# Patient Record
Sex: FEMALE | Race: WHITE | Employment: FULL TIME | URBAN - NONMETROPOLITAN AREA
[De-identification: names, ages, dates, MRNs, and addresses within clinical notes are randomized per-mention and may not be internally consistent; named-entity substitution may affect disease eponyms.]

---

## 2019-12-22 ENCOUNTER — HOSPITAL ENCOUNTER (EMERGENCY)
Age: 44
Discharge: HOME | End: 2019-12-22
Attending: EMERGENCY MEDICINE
Payer: COMMERCIAL

## 2019-12-22 VITALS
HEIGHT: 67 IN | HEART RATE: 83 BPM | WEIGHT: 182 LBS | BODY MASS INDEX: 28.56 KG/M2 | OXYGEN SATURATION: 100 % | DIASTOLIC BLOOD PRESSURE: 81 MMHG | SYSTOLIC BLOOD PRESSURE: 132 MMHG | RESPIRATION RATE: 18 BRPM | TEMPERATURE: 98.1 F

## 2019-12-22 DIAGNOSIS — J06.9 VIRAL URI WITH COUGH: Primary | ICD-10-CM

## 2019-12-22 DIAGNOSIS — J45.21 MILD INTERMITTENT ASTHMA WITH EXACERBATION: ICD-10-CM

## 2019-12-22 PROCEDURE — 99283 EMERGENCY DEPT VISIT LOW MDM: CPT

## 2019-12-22 PROCEDURE — 6370000000 HC RX 637 (ALT 250 FOR IP): Performed by: EMERGENCY MEDICINE

## 2019-12-22 RX ORDER — PREDNISONE 10 MG/1
TABLET ORAL
Qty: 30 TABLET | Refills: 0 | Status: SHIPPED | OUTPATIENT
Start: 2019-12-22 | End: 2020-01-01

## 2019-12-22 RX ORDER — IPRATROPIUM BROMIDE AND ALBUTEROL SULFATE 2.5; .5 MG/3ML; MG/3ML
1 SOLUTION RESPIRATORY (INHALATION) ONCE
Status: COMPLETED | OUTPATIENT
Start: 2019-12-22 | End: 2019-12-22

## 2019-12-22 RX ORDER — ALBUTEROL SULFATE 90 UG/1
2 AEROSOL, METERED RESPIRATORY (INHALATION) EVERY 4 HOURS
Qty: 2 INHALER | Refills: 0 | Status: SHIPPED | OUTPATIENT
Start: 2019-12-22

## 2019-12-22 RX ORDER — IBUPROFEN 600 MG/1
600 TABLET ORAL ONCE
Status: COMPLETED | OUTPATIENT
Start: 2019-12-22 | End: 2019-12-22

## 2019-12-22 RX ADMIN — IBUPROFEN 600 MG: 600 TABLET, FILM COATED ORAL at 04:52

## 2019-12-22 RX ADMIN — IPRATROPIUM BROMIDE AND ALBUTEROL SULFATE 1 AMPULE: .5; 3 SOLUTION RESPIRATORY (INHALATION) at 04:52

## 2019-12-22 RX ADMIN — PREDNISONE 50 MG: 20 TABLET ORAL at 04:52

## 2019-12-22 RX ADMIN — IPRATROPIUM BROMIDE AND ALBUTEROL SULFATE 1 AMPULE: .5; 3 SOLUTION RESPIRATORY (INHALATION) at 04:51

## 2019-12-22 ASSESSMENT — PAIN SCALES - GENERAL
PAINLEVEL_OUTOF10: 2
PAINLEVEL_OUTOF10: 2

## 2019-12-22 ASSESSMENT — PAIN DESCRIPTION - PAIN TYPE: TYPE: ACUTE PAIN

## 2019-12-22 ASSESSMENT — PAIN DESCRIPTION - DESCRIPTORS: DESCRIPTORS: ACHING;BURNING

## 2019-12-27 ENCOUNTER — HOSPITAL ENCOUNTER (EMERGENCY)
Age: 44
Discharge: HOME | End: 2019-12-27
Attending: EMERGENCY MEDICINE
Payer: COMMERCIAL

## 2019-12-27 VITALS
BODY MASS INDEX: 28.56 KG/M2 | SYSTOLIC BLOOD PRESSURE: 156 MMHG | TEMPERATURE: 97.9 F | OXYGEN SATURATION: 100 % | WEIGHT: 182 LBS | HEART RATE: 68 BPM | RESPIRATION RATE: 14 BRPM | HEIGHT: 67 IN | DIASTOLIC BLOOD PRESSURE: 84 MMHG

## 2019-12-27 DIAGNOSIS — J22 ACUTE RESPIRATORY INFECTION: Primary | ICD-10-CM

## 2019-12-27 DIAGNOSIS — R05.9 COUGH: ICD-10-CM

## 2019-12-27 PROCEDURE — 99282 EMERGENCY DEPT VISIT SF MDM: CPT

## 2019-12-27 RX ORDER — FLUCONAZOLE 150 MG/1
150 TABLET ORAL ONCE
Qty: 1 TABLET | Refills: 0 | Status: SHIPPED | OUTPATIENT
Start: 2019-12-27 | End: 2019-12-27

## 2019-12-27 RX ORDER — AZITHROMYCIN 250 MG/1
250 TABLET, FILM COATED ORAL SEE ADMIN INSTRUCTIONS
Qty: 6 TABLET | Refills: 0 | Status: SHIPPED | OUTPATIENT
Start: 2019-12-27 | End: 2020-01-01

## 2019-12-27 ASSESSMENT — PAIN DESCRIPTION - PAIN TYPE: TYPE: ACUTE PAIN

## 2019-12-27 ASSESSMENT — PAIN SCALES - GENERAL: PAINLEVEL_OUTOF10: 7

## 2019-12-27 ASSESSMENT — PAIN DESCRIPTION - FREQUENCY: FREQUENCY: CONTINUOUS

## 2019-12-27 ASSESSMENT — PAIN DESCRIPTION - LOCATION: LOCATION: BACK;CHEST

## 2019-12-27 ASSESSMENT — PAIN DESCRIPTION - DESCRIPTORS: DESCRIPTORS: BURNING

## 2020-02-04 ENCOUNTER — HOSPITAL ENCOUNTER (OUTPATIENT)
Age: 45
Discharge: HOME | End: 2020-02-04
Payer: COMMERCIAL

## 2020-02-04 ENCOUNTER — HOSPITAL ENCOUNTER (OUTPATIENT)
Dept: GENERAL RADIOLOGY | Age: 45
Discharge: HOME | End: 2020-02-04
Payer: COMMERCIAL

## 2020-02-04 PROCEDURE — 71046 X-RAY EXAM CHEST 2 VIEWS: CPT

## 2020-12-13 ENCOUNTER — APPOINTMENT (OUTPATIENT)
Dept: GENERAL RADIOLOGY | Age: 45
End: 2020-12-13
Payer: COMMERCIAL

## 2020-12-13 ENCOUNTER — HOSPITAL ENCOUNTER (EMERGENCY)
Age: 45
Discharge: HOME | End: 2020-12-13
Attending: EMERGENCY MEDICINE
Payer: COMMERCIAL

## 2020-12-13 VITALS
SYSTOLIC BLOOD PRESSURE: 131 MMHG | WEIGHT: 174 LBS | HEART RATE: 75 BPM | OXYGEN SATURATION: 98 % | DIASTOLIC BLOOD PRESSURE: 57 MMHG | TEMPERATURE: 98.5 F | RESPIRATION RATE: 18 BRPM | HEIGHT: 67 IN | BODY MASS INDEX: 27.31 KG/M2

## 2020-12-13 LAB
ANION GAP SERPL CALCULATED.3IONS-SCNC: 10 MMOL/L (ref 3–16)
BASOPHILS ABSOLUTE: 0.1 K/UL (ref 0–0.2)
BASOPHILS RELATIVE PERCENT: 0.9 %
BILIRUBIN URINE: NEGATIVE
BLOOD, URINE: NEGATIVE
BUN BLDV-MCNC: 7 MG/DL (ref 7–20)
CALCIUM SERPL-MCNC: 9.4 MG/DL (ref 8.3–10.6)
CHLORIDE BLD-SCNC: 103 MMOL/L (ref 99–110)
CLARITY: CLEAR
CO2: 25 MMOL/L (ref 21–32)
COLOR: YELLOW
CREAT SERPL-MCNC: <0.5 MG/DL (ref 0.6–1.1)
EOSINOPHILS ABSOLUTE: 0.3 K/UL (ref 0–0.6)
EOSINOPHILS RELATIVE PERCENT: 2.6 %
GFR AFRICAN AMERICAN: >60
GFR NON-AFRICAN AMERICAN: >60
GLUCOSE BLD-MCNC: 86 MG/DL (ref 70–99)
GLUCOSE URINE: NEGATIVE MG/DL
GONADOTROPIN, CHORIONIC (HCG) QUANT: <5 MIU/ML
HCT VFR BLD CALC: 41.1 % (ref 36–48)
HEMOGLOBIN: 14 G/DL (ref 12–16)
KETONES, URINE: NEGATIVE MG/DL
LEUKOCYTE ESTERASE, URINE: NEGATIVE
LYMPHOCYTES ABSOLUTE: 2.4 K/UL (ref 1–5.1)
LYMPHOCYTES RELATIVE PERCENT: 21.4 %
MCH RBC QN AUTO: 30.8 PG (ref 26–34)
MCHC RBC AUTO-ENTMCNC: 33.9 G/DL (ref 31–36)
MCV RBC AUTO: 90.7 FL (ref 80–100)
MICROSCOPIC EXAMINATION: NORMAL
MONOCYTES ABSOLUTE: 0.7 K/UL (ref 0–1.3)
MONOCYTES RELATIVE PERCENT: 5.9 %
NEUTROPHILS ABSOLUTE: 7.8 K/UL (ref 1.7–7.7)
NEUTROPHILS RELATIVE PERCENT: 69.2 %
NITRITE, URINE: NEGATIVE
PDW BLD-RTO: 13.2 % (ref 12.4–15.4)
PH UA: 6 (ref 5–8)
PLATELET # BLD: 264 K/UL (ref 135–450)
PMV BLD AUTO: 9.1 FL (ref 5–10.5)
POTASSIUM REFLEX MAGNESIUM: 3.9 MMOL/L (ref 3.5–5.1)
PROTEIN UA: NEGATIVE MG/DL
RBC # BLD: 4.53 M/UL (ref 4–5.2)
SODIUM BLD-SCNC: 138 MMOL/L (ref 136–145)
SPECIFIC GRAVITY UA: 1.01 (ref 1–1.03)
URINE TYPE: NORMAL
UROBILINOGEN, URINE: 0.2 E.U./DL
WBC # BLD: 11.3 K/UL (ref 4–11)

## 2020-12-13 PROCEDURE — 6370000000 HC RX 637 (ALT 250 FOR IP): Performed by: EMERGENCY MEDICINE

## 2020-12-13 PROCEDURE — 84702 CHORIONIC GONADOTROPIN TEST: CPT

## 2020-12-13 PROCEDURE — 96374 THER/PROPH/DIAG INJ IV PUSH: CPT

## 2020-12-13 PROCEDURE — 6360000002 HC RX W HCPCS: Performed by: EMERGENCY MEDICINE

## 2020-12-13 PROCEDURE — 36415 COLL VENOUS BLD VENIPUNCTURE: CPT

## 2020-12-13 PROCEDURE — 80048 BASIC METABOLIC PNL TOTAL CA: CPT

## 2020-12-13 PROCEDURE — 85025 COMPLETE CBC W/AUTO DIFF WBC: CPT

## 2020-12-13 PROCEDURE — 81003 URINALYSIS AUTO W/O SCOPE: CPT

## 2020-12-13 PROCEDURE — 71045 X-RAY EXAM CHEST 1 VIEW: CPT

## 2020-12-13 PROCEDURE — 99285 EMERGENCY DEPT VISIT HI MDM: CPT

## 2020-12-13 PROCEDURE — 96375 TX/PRO/DX INJ NEW DRUG ADDON: CPT

## 2020-12-13 PROCEDURE — U0003 INFECTIOUS AGENT DETECTION BY NUCLEIC ACID (DNA OR RNA); SEVERE ACUTE RESPIRATORY SYNDROME CORONAVIRUS 2 (SARS-COV-2) (CORONAVIRUS DISEASE [COVID-19]), AMPLIFIED PROBE TECHNIQUE, MAKING USE OF HIGH THROUGHPUT TECHNOLOGIES AS DESCRIBED BY CMS-2020-01-R: HCPCS

## 2020-12-13 PROCEDURE — 2580000003 HC RX 258: Performed by: EMERGENCY MEDICINE

## 2020-12-13 PROCEDURE — 96376 TX/PRO/DX INJ SAME DRUG ADON: CPT

## 2020-12-13 RX ORDER — ONDANSETRON 4 MG/1
4 TABLET, ORALLY DISINTEGRATING ORAL EVERY 8 HOURS PRN
Qty: 15 TABLET | Refills: 0 | Status: SHIPPED | OUTPATIENT
Start: 2020-12-13 | End: 2021-08-16

## 2020-12-13 RX ORDER — ACETAMINOPHEN 325 MG/1
650 TABLET ORAL ONCE
Status: COMPLETED | OUTPATIENT
Start: 2020-12-13 | End: 2020-12-13

## 2020-12-13 RX ORDER — 0.9 % SODIUM CHLORIDE 0.9 %
1000 INTRAVENOUS SOLUTION INTRAVENOUS ONCE
Status: COMPLETED | OUTPATIENT
Start: 2020-12-13 | End: 2020-12-13

## 2020-12-13 RX ORDER — ONDANSETRON 2 MG/ML
4 INJECTION INTRAMUSCULAR; INTRAVENOUS ONCE
Status: COMPLETED | OUTPATIENT
Start: 2020-12-13 | End: 2020-12-13

## 2020-12-13 RX ORDER — KETOROLAC TROMETHAMINE 30 MG/ML
15 INJECTION, SOLUTION INTRAMUSCULAR; INTRAVENOUS ONCE
Status: COMPLETED | OUTPATIENT
Start: 2020-12-13 | End: 2020-12-13

## 2020-12-13 RX ADMIN — ONDANSETRON HYDROCHLORIDE 4 MG: 2 INJECTION, SOLUTION INTRAMUSCULAR; INTRAVENOUS at 19:34

## 2020-12-13 RX ADMIN — SODIUM CHLORIDE 1000 ML: 9 INJECTION, SOLUTION INTRAVENOUS at 16:54

## 2020-12-13 RX ADMIN — KETOROLAC TROMETHAMINE 15 MG: 30 INJECTION, SOLUTION INTRAMUSCULAR; INTRAVENOUS at 16:54

## 2020-12-13 RX ADMIN — ONDANSETRON HYDROCHLORIDE 4 MG: 2 INJECTION, SOLUTION INTRAMUSCULAR; INTRAVENOUS at 16:54

## 2020-12-13 RX ADMIN — ACETAMINOPHEN 650 MG: 325 TABLET ORAL at 19:34

## 2020-12-13 ASSESSMENT — PAIN SCALES - GENERAL
PAINLEVEL_OUTOF10: 3

## 2020-12-13 ASSESSMENT — PAIN DESCRIPTION - LOCATION: LOCATION: HEAD

## 2020-12-14 ENCOUNTER — CARE COORDINATION (OUTPATIENT)
Dept: CARE COORDINATION | Age: 45
End: 2020-12-14

## 2020-12-16 LAB — SARS-COV-2, NAA: NOT DETECTED

## 2020-12-16 ASSESSMENT — ENCOUNTER SYMPTOMS
VOMITING: 1
NAUSEA: 1
ABDOMINAL PAIN: 0
SHORTNESS OF BREATH: 0
EYE DISCHARGE: 0
COUGH: 1
SORE THROAT: 0
DIARRHEA: 0
BACK PAIN: 0

## 2020-12-16 NOTE — ED PROVIDER NOTES
[address] [facility]        Pt Name: Darling Barnes  MRN: [id number]  [place] 1975  Date of evaluation: 12/13/2020  Provider: Eugenio Segura MD  PCP: Yunier Izquierdo  ED Attending: No att. providers found    [address] [place] [address]       Chief Complaint   Patient presents with    Emesis    Headache    Chills     no thermometer at home, loss of taste    Cough       HISTORY OF PRESENT ILLNESS   (Location/Symptom, Timing/Onset, Context/Setting, Quality, Duration, Modifying Factors, Severity)  Note limiting factors. Darling Barnes is a 39 y.o. female who presents to the ED with increasing nausea/vomiting, headache, cough, loss of taste, chills, and a feeling of fever. It's been present and increasing for the last several days. Patient denies any known contacts with COVID. She is unable to keep anything down. Nothing really seems to make the symptoms better or worse. She denies SOB, myalgias. Headache is bifrontal and throbbing, moderate, without exacerbating or alleviating factors. Similar to prior headaches she has had. History is obtained from the patient. REVIEW OF SYSTEMS    (2-9 systems for level 4, 10 or more for level 5)     Review of Systems   Constitutional: Positive for chills and fever. HENT: Negative for congestion and sore throat. Eyes: Negative for discharge. Respiratory: Positive for cough. Negative for shortness of breath. Cardiovascular: Negative for chest pain. Gastrointestinal: Positive for nausea and vomiting. Negative for abdominal pain and diarrhea. Endocrine: Negative for polydipsia. Genitourinary: Negative for dysuria and urgency. Musculoskeletal: Negative for arthralgias, back pain and myalgias. Skin: Negative for rash. Neurological: Positive for headaches. Negative for weakness. Hematological: Does not bruise/bleed easily. Psychiatric/Behavioral: Negative for confusion.        Positives and Pertinent negatives as per HPI. Except as noted above in the ROS, all other systems were reviewed and negative. PAST MEDICAL HISTORY     Past Medical History:   Diagnosis Date    Asthma     MRSA (methicillin resistant staph aureus) culture positive 1/11/15    throat    Obesity          SURGICAL HISTORY     Past Surgical History:   Procedure Laterality Date     SECTION      HYSTERECTOMY      TUBAL LIGATION           CURRENTMEDICATIONS       Discharge Medication List as of 2020  7:21 PM      CONTINUE these medications which have NOT CHANGED    Details   Montelukast Sodium (SINGULAIR PO) Take by mouthHistorical Med      Cetirizine HCl (ZYRTEC PO) Take by mouthHistorical Med      Mometasone Furoate (NASONEX NA) by Nasal routeHistorical Med      VITAMIN D, CHOLECALCIFEROL, PO Take by mouthHistorical Med      albuterol sulfate  (90 Base) MCG/ACT inhaler Inhale 2 puffs into the lungs every 4 hours, Disp-2 Inhaler, R-0Print               ALLERGIES     Avelox [moxifloxacin], Ultram [tramadol hcl], and Codeine    FAMILYHISTORY     No family history on file. SOCIAL HISTORY       Social History     Socioeconomic History    Marital status:       Spouse name: Not on file    Number of children: Not on file    Years of education: Not on file    Highest education level: Not on file   Occupational History    Not on file   Social Needs    Financial resource strain: Not on file    Food insecurity     Worry: Not on file     Inability: Not on file    Transportation needs     Medical: Not on file     Non-medical: Not on file   Tobacco Use    Smoking status: Current Every Day Smoker     Packs/day: 1.00     Years: 22.00     Pack years: 22.00     Types: Cigarettes    Smokeless tobacco: Never Used   Substance and Sexual Activity    Alcohol use: No    Drug use: No    Sexual activity: Yes     Partners: Male   Lifestyle    Physical activity     Days per week: Not on file     Minutes per session: Not on file    Stress: Not on file   Relationships    Social connections     Talks on phone: Not on file     Gets together: Not on file     Attends Taoist service: Not on file     Active member of club or organization: Not on file     Attends meetings of clubs or organizations: Not on file     Relationship status: Not on file    Intimate partner violence     Fear of current or ex partner: Not on file     Emotionally abused: Not on file     Physically abused: Not on file     Forced sexual activity: Not on file   Other Topics Concern    Not on file   Social History Narrative    Not on file       SCREENINGS             PHYSICAL EXAM    (up to 7 for level 4, 8 or more for level 5)     ED Triage Vitals [12/13/20 1511]   BP Temp Temp src Pulse Resp SpO2 Height Weight   135/68 98.5 °F (36.9 °C) -- 70 20 100 % 5' 7\" (1.702 m) 174 lb (78.9 kg)       Physical Exam  Constitutional:       General: She is not in acute distress. Appearance: She is well-developed and overweight. HENT:      Head: Normocephalic and atraumatic. Right Ear: External ear normal.      Left Ear: External ear normal.   Eyes:      General: No scleral icterus. Conjunctiva/sclera: Conjunctivae normal.   Neck:      Musculoskeletal: Normal range of motion and neck supple. Cardiovascular:      Rate and Rhythm: Normal rate and regular rhythm. Heart sounds: Normal heart sounds. No murmur. No friction rub. No gallop. Pulmonary:      Effort: Pulmonary effort is normal. No respiratory distress. Breath sounds: Normal breath sounds. No wheezing. Abdominal:      General: Bowel sounds are normal. There is no distension. Palpations: Abdomen is soft. Tenderness: There is no abdominal tenderness. There is no guarding or rebound. Musculoskeletal: Normal range of motion. General: No tenderness. Lymphadenopathy:      Cervical: No cervical adenopathy. Skin:     General: Skin is warm and dry.       Findings: No rash. Neurological:      Mental Status: She is alert and oriented to person, place, and time. Cranial Nerves: No cranial nerve deficit. Psychiatric:         Behavior: Behavior is cooperative.          DIAGNOSTIC RESULTS   LABS:    Results for orders placed or performed during the hospital encounter of 12/13/20   Urinalysis, reflex to microscopic   Result Value Ref Range    Color, UA Yellow Straw/Yellow    Clarity, UA Clear Clear    Glucose, Ur Negative Negative mg/dL    Bilirubin Urine Negative Negative    Ketones, Urine Negative Negative mg/dL    Specific Gravity, UA 1.015 1.005 - 1.030    Blood, Urine Negative Negative    pH, UA 6.0 5.0 - 8.0    Protein, UA Negative Negative mg/dL    Urobilinogen, Urine 0.2 <2.0 E.U./dL    Nitrite, Urine Negative Negative    Leukocyte Esterase, Urine Negative Negative    Microscopic Examination Not Indicated     Urine Type NotGiven    CBC auto differential   Result Value Ref Range    WBC 11.3 (H) 4.0 - 11.0 K/uL    RBC 4.53 4.00 - 5.20 M/uL    Hemoglobin 14.0 12.0 - 16.0 g/dL    Hematocrit 41.1 36.0 - 48.0 %    MCV 90.7 80.0 - 100.0 fL    MCH 30.8 26.0 - 34.0 pg    MCHC 33.9 31.0 - 36.0 g/dL    RDW 13.2 12.4 - 15.4 %    Platelets 786 710 - 727 K/uL    MPV 9.1 5.0 - 10.5 fL    Neutrophils % 69.2 %    Lymphocytes % 21.4 %    Monocytes % 5.9 %    Eosinophils % 2.6 %    Basophils % 0.9 %    Neutrophils Absolute 7.8 (H) 1.7 - 7.7 K/uL    Lymphocytes Absolute 2.4 1.0 - 5.1 K/uL    Monocytes Absolute 0.7 0.0 - 1.3 K/uL    Eosinophils Absolute 0.3 0.0 - 0.6 K/uL    Basophils Absolute 0.1 0.0 - 0.2 K/uL   Basic Metabolic Panel w/ Reflex to MG   Result Value Ref Range    Sodium 138 136 - 145 mmol/L    Potassium reflex Magnesium 3.9 3.5 - 5.1 mmol/L    Chloride 103 99 - 110 mmol/L    CO2 25 21 - 32 mmol/L    Anion Gap 10 3 - 16    Glucose 86 70 - 99 mg/dL    BUN 7 7 - 20 mg/dL    CREATININE <0.5 (L) 0.6 - 1.1 mg/dL    GFR Non-African American >60 >60    GFR  >60 >60    Calcium 9.4 8.3 - 10.6 mg/dL   hCG, quantitative, pregnancy   Result Value Ref Range    hCG Quant <5.0 <5.0 mIU/mL   COVID-19   Result Value Ref Range    SARS-CoV-2, SOCRATES NOT DETECTED NOT DETECTED       All other labs were within normal range ornot returned as of this dictation. EKG: All EKG's are interpreted by the Emergency Department Physician who either signs or Co-signs this chart in the absence of a cardiologist.  Please see their note for interpretation of EKG. RADIOLOGY:   Non-plain film images such as CT, Ultrasound and MRI are read by the radiologist.  Plain radiographic images are visualized and preliminarily interpreted by the ED Provider with the belowfindings:    Interpretation per the Radiologist below, if available at the time of this note:    XR CHEST PORTABLE   Final Result   No acute airspace disease identified. PROCEDURES   Unless otherwise noted below, none     Procedures    CRITICAL CARE TIME   N/A    CONSULTS:  None      EMERGENCY DEPARTMENT COURSE and DIFFERENTIAL DIAGNOSIS/MDM:   Vitals:    Vitals:    12/13/20 1511 12/13/20 1654 12/13/20 1915   BP: 135/68 134/79 (!) 131/57   Pulse: 70 61 75   Resp: 20 18 18   Temp: 98.5 °F (36.9 °C)     SpO2: 100% 98% 98%   Weight: 174 lb (78.9 kg)     Height: 5' 7\" (1.702 m)         Patient was given the following medications:  Medications   ketorolac (TORADOL) injection 15 mg (15 mg Intravenous Given 12/13/20 1654)   ondansetron (ZOFRAN) injection 4 mg (4 mg Intravenous Given 12/13/20 1654)   0.9 % sodium chloride bolus (0 mLs Intravenous Stopped 12/13/20 1754)   ondansetron (ZOFRAN) injection 4 mg (4 mg Intravenous Given 12/13/20 1934)   acetaminophen (TYLENOL) tablet 650 mg (650 mg Oral Given 12/13/20 1934)     Patient has a benign abdominal exam.  I am concerned that she may have COVID given the lost taste. She was given IV fluids, toradol, zofran, acetaminophen with improvement of her symptoms.   Patient's lab work does not reveal a cause for her symptoms. COVID screening was obtained and is pending. Patient feels improved enough to go home. I've written for nausea medication. I want her to see her PCP for further outpatient follow up. Patient is agreeable with this plan. I estimate there is LOW risk for ACUTE APPENDICITIS, BOWEL OBSTRUCTION, CHOLECYSTITIS, DIVERTICULITIS, INCARCERATED HERNIA, PANCREATITIS, PELVIC INFLAMMATORY DISEASE, PERFORATED BOWEL or ULCER, PREGNANCY, or TUBO-OVARIAN ABSCESS, thus I consider the discharge disposition reasonable. Also, there is no evidence or peritonitis, sepsis, or toxicity. Ravinder Davenport and I have discussed the diagnosis and risks, and we agree with discharging home to follow-up with their primary doctor. We also discussed returning to the Emergency Department immediately if new or worsening symptoms occur. We have discussed the symptoms which are most concerning (e.g., bloody stool, fever, changing or worsening pain, vomiting) that necessitate immediate return. Blood pressure (!) 131/57, pulse 75, temperature 98.5 °F (36.9 °C), resp. rate 18, height 5' 7\" (1.702 m), weight 174 lb (78.9 kg), last menstrual period 11/16/2014, SpO2 98 %, not currently breastfeeding. The patient understands the importance of follow up and reasons to return. FINAL IMPRESSION      1. Non-intractable vomiting with nausea, unspecified vomiting type    2. Acute nonintractable headache, unspecified headache type    3. Cough    4. Suspected COVID-19 virus infection          DISPOSITION/PLAN   DISPOSITION Decision To Discharge 12/13/2020 07:18:59 PM      PATIENT REFERRED TO:  Fiorella Muñoz  [address]   [phone]    Schedule an appointment as soon as possible for a visit in 1 week      [facility] [id number]  [facility] Emergency Department  [address]  Go to   If symptoms worsen      DISCHARGE MEDICATIONS:  Discharge Medication List as of 12/13/2020

## 2020-12-16 NOTE — CARE COORDINATION
ACM tried contacting pt on 2020 to follow up on ED visit from 2020 dx: Non-intractable vomiting with nausea, unspecified vomiting typeAcute nonintractable headache, unspecified headache typeCough Suspected COVID-19 virus infection. ACM was able to contact Murrell Najjar on 2020. Today, she is c/o diarrhea, HA, cough. She denies fever and shortness of breath. Symptoms x 6 days started last Thursday,   She is taking Zofran. Encouraged hydration. Suggested Activa yogurt. Emphasized importance of follow up with PCP. Offered to assist.   Reviewed CDC protocol. Informed COVID test non-detected. Advised to consult PCP regarding return to work. 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 Discussed COVID-19 related testing which was available at this time. Test results were negative. Patient informed of results, if available? Yes    Care Transition Nurse/ Ambulatory Care Manager contacted the patient by telephone to perform post discharge assessment. Call within 2 business days of discharge: Yes. Verified name and  with patient as identifiers. Provided introduction to self, and explanation of the CTN/ACM role, and reason for call due to risk factors for infection and/or exposure to COVID-19. Symptoms reviewed with patient who verbalized the following symptoms: cough, nausea, diarrhea, no new symptoms, no worsening symptoms and denies fever and SOB. Due to no new or worsening symptoms encounter was not routed to provider for escalation. Discussed follow-up appointments. If no appointment was previously scheduled, appointment scheduling offered: Yes  [facility] follow up appointment(s): No future appointments.   [facility](s):     Non-face-to-face services provided:  Obtained and reviewed discharge summary and/or continuity of care documents  Reviewed and followed up on pending diagnostic tests and treatments-reviewed labs and x-rays. discussed need for follow up  Education of patient/family/caregiver/guardian to support self-management-encouraged hydration, made dietary suggestions, rest and to monitor symptoms and follow up as suggested  Assessment and support for treatment adherence and medication management-discussed purpose of zofran and directions. Advance Care Planning:   Does patient have an Advance Directive:  not on file. Patient has following risk factors of: no known risk factors. CTN/ACM reviewed discharge instructions, medical action plan and red flags such as increased shortness of breath, increasing fever and signs of decompensation with patient who verbalized understanding. Discussed exposure protocols and quarantine with CDC Guidelines What to do if you are sick with coronavirus disease 2019.  Patient was given an opportunity for questions and concerns. The patient agrees to contact the [facility] hot line and PCP office for questions related to their healthcare. CTN/ACM provided contact information for future needs. Reviewed and educated patient on any new and changed medications related to discharge diagnosis     Patient/family/caregiver given information for GetWell Loop and agrees to enroll yes  Patient's preferred e-mail: [email or web address]   Patient's preferred phone number: [phone]  Based on Loop alert triggers, patient will be contacted by nurse care manager for worsening symptoms. Pt will be further monitored by COVID Loop Team based on severity of symptoms and risk factors.

## 2021-01-18 ENCOUNTER — HOSPITAL ENCOUNTER (OUTPATIENT)
Dept: GENERAL RADIOLOGY | Age: 46
Discharge: HOME | End: 2021-01-18
Payer: COMMERCIAL

## 2021-01-18 ENCOUNTER — HOSPITAL ENCOUNTER (OUTPATIENT)
Age: 46
Discharge: HOME | End: 2021-01-18
Payer: COMMERCIAL

## 2021-01-18 ENCOUNTER — HOSPITAL ENCOUNTER (OUTPATIENT)
Age: 46
End: 2021-01-18
Payer: COMMERCIAL

## 2021-01-18 DIAGNOSIS — M54.50 CHRONIC MIDLINE LOW BACK PAIN WITHOUT SCIATICA: ICD-10-CM

## 2021-01-18 DIAGNOSIS — G89.29 CHRONIC MIDLINE LOW BACK PAIN WITHOUT SCIATICA: ICD-10-CM

## 2021-01-18 PROCEDURE — 73502 X-RAY EXAM HIP UNI 2-3 VIEWS: CPT

## 2021-01-18 PROCEDURE — 72100 X-RAY EXAM L-S SPINE 2/3 VWS: CPT

## 2021-04-28 ENCOUNTER — HOSPITAL ENCOUNTER (OUTPATIENT)
Dept: GENERAL RADIOLOGY | Age: 46
Discharge: HOME | End: 2021-04-28
Payer: COMMERCIAL

## 2021-04-28 ENCOUNTER — HOSPITAL ENCOUNTER (OUTPATIENT)
Age: 46
Discharge: HOME | End: 2021-04-28
Payer: COMMERCIAL

## 2021-04-28 DIAGNOSIS — M47.816 LUMBAR SPONDYLOSIS: ICD-10-CM

## 2021-04-28 PROCEDURE — 72110 X-RAY EXAM L-2 SPINE 4/>VWS: CPT

## 2021-04-30 ENCOUNTER — HOSPITAL ENCOUNTER (OUTPATIENT)
Dept: GENERAL RADIOLOGY | Age: 46
Discharge: HOME | End: 2021-04-30
Payer: COMMERCIAL

## 2021-04-30 ENCOUNTER — HOSPITAL ENCOUNTER (OUTPATIENT)
Age: 46
Discharge: HOME | End: 2021-04-30
Payer: COMMERCIAL

## 2021-04-30 DIAGNOSIS — R52 PAIN: ICD-10-CM

## 2021-04-30 PROCEDURE — 71046 X-RAY EXAM CHEST 2 VIEWS: CPT

## 2021-08-16 ENCOUNTER — HOSPITAL ENCOUNTER (EMERGENCY)
Age: 46
Discharge: HOME | DRG: 115 | End: 2021-08-16
Payer: COMMERCIAL

## 2021-08-16 VITALS
OXYGEN SATURATION: 98 % | TEMPERATURE: 98.3 F | HEART RATE: 90 BPM | SYSTOLIC BLOOD PRESSURE: 173 MMHG | WEIGHT: 183 LBS | RESPIRATION RATE: 16 BRPM | BODY MASS INDEX: 28.72 KG/M2 | HEIGHT: 67 IN | DIASTOLIC BLOOD PRESSURE: 92 MMHG

## 2021-08-16 DIAGNOSIS — R03.0 ELEVATED BLOOD PRESSURE READING: ICD-10-CM

## 2021-08-16 DIAGNOSIS — H60.391 ACUTE INFECTION OF RIGHT EXTERNAL EAR: Primary | ICD-10-CM

## 2021-08-16 PROCEDURE — 99284 EMERGENCY DEPT VISIT MOD MDM: CPT

## 2021-08-16 PROCEDURE — 6370000000 HC RX 637 (ALT 250 FOR IP): Performed by: PHYSICIAN ASSISTANT

## 2021-08-16 RX ORDER — SULFAMETHOXAZOLE AND TRIMETHOPRIM 800; 160 MG/1; MG/1
1 TABLET ORAL ONCE
Status: COMPLETED | OUTPATIENT
Start: 2021-08-16 | End: 2021-08-16

## 2021-08-16 RX ORDER — CYCLOBENZAPRINE HCL 5 MG
5 TABLET ORAL 3 TIMES DAILY PRN
COMMUNITY
End: 2021-08-16

## 2021-08-16 RX ORDER — HYDROCODONE BITARTRATE AND ACETAMINOPHEN 5; 325 MG/1; MG/1
1 TABLET ORAL EVERY 8 HOURS PRN
COMMUNITY
End: 2022-05-11

## 2021-08-16 RX ORDER — IBUPROFEN 800 MG/1
800 TABLET ORAL EVERY 8 HOURS PRN
Qty: 20 TABLET | Refills: 0 | Status: SHIPPED | OUTPATIENT
Start: 2021-08-16

## 2021-08-16 RX ORDER — CEPHALEXIN 500 MG/1
500 CAPSULE ORAL 4 TIMES DAILY
Qty: 40 CAPSULE | Refills: 0 | Status: ON HOLD | OUTPATIENT
Start: 2021-08-16 | End: 2021-08-19 | Stop reason: HOSPADM

## 2021-08-16 RX ORDER — SULFAMETHOXAZOLE AND TRIMETHOPRIM 800; 160 MG/1; MG/1
1 TABLET ORAL 2 TIMES DAILY
Qty: 20 TABLET | Refills: 0 | Status: ON HOLD | OUTPATIENT
Start: 2021-08-16 | End: 2021-08-19 | Stop reason: HOSPADM

## 2021-08-16 RX ORDER — CEPHALEXIN 500 MG/1
500 CAPSULE ORAL ONCE
Status: COMPLETED | OUTPATIENT
Start: 2021-08-16 | End: 2021-08-16

## 2021-08-16 RX ORDER — CYCLOBENZAPRINE HCL 10 MG
1 TABLET ORAL NIGHTLY PRN
COMMUNITY
Start: 2021-07-07

## 2021-08-16 RX ORDER — IBUPROFEN 400 MG/1
800 TABLET ORAL ONCE
Status: COMPLETED | OUTPATIENT
Start: 2021-08-16 | End: 2021-08-16

## 2021-08-16 RX ADMIN — SULFAMETHOXAZOLE AND TRIMETHOPRIM 1 TABLET: 800; 160 TABLET ORAL at 19:42

## 2021-08-16 RX ADMIN — CEPHALEXIN 500 MG: 500 CAPSULE ORAL at 19:42

## 2021-08-16 RX ADMIN — IBUPROFEN 800 MG: 400 TABLET ORAL at 19:42

## 2021-08-16 ASSESSMENT — PAIN DESCRIPTION - FREQUENCY: FREQUENCY: CONTINUOUS

## 2021-08-16 ASSESSMENT — ENCOUNTER SYMPTOMS
COUGH: 0
VOMITING: 0

## 2021-08-16 ASSESSMENT — PAIN SCALES - GENERAL
PAINLEVEL_OUTOF10: 6
PAINLEVEL_OUTOF10: 6

## 2021-08-16 ASSESSMENT — PAIN DESCRIPTION - PAIN TYPE: TYPE: ACUTE PAIN

## 2021-08-16 ASSESSMENT — PAIN DESCRIPTION - LOCATION: LOCATION: EAR

## 2021-08-16 ASSESSMENT — PAIN DESCRIPTION - PROGRESSION: CLINICAL_PROGRESSION: NOT CHANGED

## 2021-08-16 ASSESSMENT — PAIN DESCRIPTION - ONSET: ONSET: ON-GOING

## 2021-08-16 ASSESSMENT — PAIN DESCRIPTION - ORIENTATION: ORIENTATION: RIGHT

## 2021-08-16 ASSESSMENT — PAIN DESCRIPTION - DESCRIPTORS: DESCRIPTORS: BURNING;ACHING;STABBING

## 2021-08-16 NOTE — ED PROVIDER NOTES
[address] [facility]        Pt Name: Martha Dallas  MRN: [id number]  [place] 1975  Date of evaluation: 2021  Provider: Royal Guerin PA-C  PCP: Amber Thomas    Shared Visit or Autonomous Visit: MARCO A. I have evaluated this patient. My supervising physician was available for consultation. CHIEF COMPLAINT       Chief Complaint   Patient presents with    Ear Problem     Edema to the right ear x1 week        HISTORY OF PRESENT ILLNESS   (Location/Symptom, Timing/Onset, Context/Setting, Quality, Duration, Modifying Factors, Severity)  Note limiting factors. Martha Dallas is a 55 y.o. female presenting to the emergency department for evaluation of right ear pain and swelling for the past 1 week. States thinks she was bit by a spider but could not see exactly where it bit her she did initially have itching and now it is painful. States she squeezed it and it drained a little bit from inside her ear. No fevers. No vomiting. No cough or cold symptoms. The history is provided by the patient. Nursing Notes were reviewed    REVIEW OF SYSTEMS    (2-9 systems for level 4, 10 or more for level 5)     Review of Systems   Constitutional: Negative for fever. HENT: Positive for ear pain. Respiratory: Negative for cough. Gastrointestinal: Negative for vomiting. Positives and Pertinent negatives as per HPI.        PAST MEDICAL HISTORY     Past Medical History:   Diagnosis Date    Asthma     MRSA (methicillin resistant staph aureus) culture positive 1/11/15    throat    Obesity          SURGICAL HISTORY     Past Surgical History:   Procedure Laterality Date     SECTION      HYSTERECTOMY      TUBAL LIGATION           CURRENTMEDICATIONS       Previous Medications    ALBUTEROL SULFATE  (90 BASE) MCG/ACT INHALER    Inhale 2 puffs into the lungs every 4 hours    CETIRIZINE HCL (ZYRTEC PO)    Take by mouth CYCLOBENZAPRINE (FLEXERIL) 10 MG TABLET    Take 1 tablet by mouth nightly as needed    DICLOFENAC SODIUM (VOLTAREN) 1 % GEL    Apply 1 g topically every 12 hours    HYDROCODONE-ACETAMINOPHEN (NORCO) 5-325 MG PER TABLET    Take 1 tablet by mouth every 8 hours as needed for Pain. MOMETASONE FUROATE (NASONEX NA)    by Nasal route    MONTELUKAST SODIUM (SINGULAIR PO)    Take by mouth    VITAMIN D, CHOLECALCIFEROL, PO    Take by mouth         ALLERGIES     Avelox [moxifloxacin], Ultram [tramadol hcl], and Codeine    FAMILYHISTORY     History reviewed. No pertinent family history. SOCIAL HISTORY       Social History     Socioeconomic History    Marital status:      Spouse name: None    Number of children: None    Years of education: None    Highest education level: None   Occupational History    None   Tobacco Use    Smoking status: Current Every Day Smoker     Packs/day: 1.00     Years: 22.00     Pack years: 22.00     Types: Cigarettes    Smokeless tobacco: Never Used   Vaping Use    Vaping Use: Never used   Substance and Sexual Activity    Alcohol use: No    Drug use: No    Sexual activity: Yes     Partners: Male   Other Topics Concern    None   Social History Narrative    None     Social Determinants of Health     Financial Resource Strain:     Difficulty of Paying Living Expenses:    Food Insecurity:     Worried About Running Out of Food in the Last Year:     Ran Out of Food in the Last Year:    Transportation Needs:     Lack of Transportation (Medical):      Lack of Transportation (Non-Medical):    Physical Activity:     Days of Exercise per Week:     Minutes of Exercise per Session:    Stress:     Feeling of Stress :    Social Connections:     Frequency of Communication with Friends and Family:     Frequency of Social Gatherings with Friends and Family:     Attends Orthodox Services:     Active Member of Clubs or Organizations:     Attends Club or Organization Meetings:  Marital Status:    Intimate Partner Violence:     Fear of Current or Ex-Partner:     Emotionally Abused:     Physically Abused:     Sexually Abused:        SCREENINGS    Clinton Coma Scale  Eye Opening: Spontaneous  Best Verbal Response: Oriented  Best Motor Response: Obeys commands  Luis Coma Scale Score: 15        PHYSICAL EXAM    (up to 7 for level 4, 8 or more for level 5)     ED Triage Vitals [08/16/21 1924]   BP Temp Temp Source Pulse Resp SpO2 Height Weight   (!) 173/92 98.3 °F (36.8 °C) Oral 90 16 98 % 5' 7\" (1.702 m) 183 lb (83 kg)       Physical Exam  Vitals and nursing note reviewed. Constitutional:       Appearance: She is well-developed. She is not ill-appearing or toxic-appearing. HENT:      Head: Normocephalic and atraumatic. Right Ear: Tympanic membrane and ear canal normal. No mastoid tenderness. Tympanic membrane is not erythematous or bulging. Left Ear: Tympanic membrane and ear canal normal.      Ears:        Mouth/Throat:      Mouth: Mucous membranes are moist.      Pharynx: Oropharynx is clear. No pharyngeal swelling or posterior oropharyngeal erythema. Eyes:      Conjunctiva/sclera: Conjunctivae normal.   Cardiovascular:      Rate and Rhythm: Normal rate and regular rhythm. Heart sounds: Normal heart sounds. Pulmonary:      Effort: Pulmonary effort is normal. No respiratory distress. Breath sounds: Normal breath sounds. No stridor. No wheezing, rhonchi or rales. Skin:     General: Skin is warm and dry. Neurological:      Mental Status: She is alert and oriented to person, place, and time. Motor: No abnormal muscle tone. Psychiatric:         Behavior: Behavior normal.         DIAGNOSTIC RESULTS   LABS:    Labs Reviewed - No data to display    All other labs were within normal range or not returned as of this dictation. EKG:  All EKG's are interpreted by the Emergency Department Physician in the absence of a cardiologist.  Please see their note for interpretation of EKG. RADIOLOGY:   Non-plain film images such as CT, Ultrasound and MRI are read by the radiologist. Plain radiographic images are visualized andpreliminarily interpreted by the  ED Provider with the below findings:        Interpretation perthe Radiologist below, if available at the time of this note:    No orders to display     No results found. PROCEDURES   Unless otherwise noted below, none     Procedures    CRITICAL CARE TIME   N/A    CONSULTS:  None      EMERGENCY DEPARTMENT COURSE and DIFFERENTIAL DIAGNOSIS/MDM:   Vitals:    Vitals:    08/16/21 1924   BP: (!) 173/92   Pulse: 90   Resp: 16   Temp: 98.3 °F (36.8 °C)   TempSrc: Oral   SpO2: 98%   Weight: 183 lb (83 kg)   Height: 5' 7\" (1.702 m)       Patient was given thefollowing medications:  Medications   sulfamethoxazole-trimethoprim (BACTRIM DS;SEPTRA DS) 800-160 MG per tablet 1 tablet (has no administration in time range)   cephALEXin (KEFLEX) capsule 500 mg (has no administration in time range)   ibuprofen (ADVIL;MOTRIN) tablet 800 mg (has no administration in time range)       ED course  Patient presented to the ER for evaluation of right ear pain and swelling concern for possible spider bite and infection. On exam she has tenderness swelling and faint erythema to the external right ear conchal bowl area. TM and canal normal.  No mastoid tenderness. Afebrile. Not toxic appearing. Elevated blood pressure otherwise vital stable. No palpable fluid collection to drain. Recommend treatment with antibiotic she is started on Bactrim and Keflex. I advised follow-up within 48 hours with either primary care or ENT provided referral.  Return for worsening. I estimate there is LOW risk for SEVERE CELLULITIS, MENINGITIS, PERITONSILLAR ABSCESS, SEPSIS, MALIGNANT OTITIS EXTERNA, OR EPIGLOTTITIS thus I consider the discharge disposition reasonable. FINAL IMPRESSION      1. Acute infection of right external ear    2. Elevated blood pressure reading          DISPOSITION/PLAN   DISPOSITION Decision to Discharge    PATIENT REFERREDTO:  Aureliano Velazquez  [address] [address]  [phone]    In 2 days      [address] Kannan Aguirre [address] [facility] Tahir Costa Sudeep [phone]    In 2 days  ENT, As needed if not improving    [facility] Emergency Department  [address] [facility],[address]  [phone]    If symptoms worsen      DISCHARGE MEDICATIONS:  New Prescriptions    CEPHALEXIN (KEFLEX) 500 MG CAPSULE    Take 1 capsule by mouth 4 times daily for 10 days    IBUPROFEN (IBU) 800 MG TABLET    Take 1 tablet by mouth every 8 hours as needed for Pain    SULFAMETHOXAZOLE-TRIMETHOPRIM (BACTRIM DS) 800-160 MG PER TABLET    Take 1 tablet by mouth 2 times daily for 10 days       DISCONTINUED MEDICATIONS:  Discontinued Medications    CYCLOBENZAPRINE (FLEXERIL) 5 MG TABLET    Take 5 mg by mouth 3 times daily as needed for Muscle spasms    ONDANSETRON (ZOFRAN ODT) 4 MG DISINTEGRATING TABLET    Take 1 tablet by mouth every 8 hours as needed for Nausea or Vomiting              (Please note that portions ofthis note were completed with a voice recognition program.  Efforts were made to edit the dictations but occasionally words are mis-transcribed.)    Chica Gibson PA-C (electronically signed)            Chana Martin PA-C  08/16/21 1941

## 2021-08-18 ENCOUNTER — HOSPITAL ENCOUNTER (INPATIENT)
Age: 46
LOS: 1 days | Discharge: HOME | DRG: 115 | End: 2021-08-19
Attending: EMERGENCY MEDICINE | Admitting: INTERNAL MEDICINE
Payer: COMMERCIAL

## 2021-08-18 ENCOUNTER — APPOINTMENT (OUTPATIENT)
Dept: CT IMAGING | Age: 46
DRG: 115 | End: 2021-08-18
Payer: COMMERCIAL

## 2021-08-18 DIAGNOSIS — M94.8X9 CHONDRITIS: Primary | ICD-10-CM

## 2021-08-18 PROBLEM — H60.01: Status: ACTIVE | Noted: 2021-08-18

## 2021-08-18 LAB
A/G RATIO: 1.5 (ref 1.1–2.2)
ALBUMIN SERPL-MCNC: 4.4 G/DL (ref 3.4–5)
ALP BLD-CCNC: 79 U/L (ref 40–129)
ALT SERPL-CCNC: 13 U/L (ref 10–40)
ANION GAP SERPL CALCULATED.3IONS-SCNC: 13 MMOL/L (ref 3–16)
AST SERPL-CCNC: 16 U/L (ref 15–37)
BASOPHILS ABSOLUTE: 0.1 K/UL (ref 0–0.2)
BASOPHILS RELATIVE PERCENT: 1.1 %
BILIRUB SERPL-MCNC: <0.2 MG/DL (ref 0–1)
BUN BLDV-MCNC: 4 MG/DL (ref 7–20)
CALCIUM SERPL-MCNC: 9.4 MG/DL (ref 8.3–10.6)
CHLORIDE BLD-SCNC: 103 MMOL/L (ref 99–110)
CO2: 22 MMOL/L (ref 21–32)
CREAT SERPL-MCNC: 0.6 MG/DL (ref 0.6–1.1)
EOSINOPHILS ABSOLUTE: 0.3 K/UL (ref 0–0.6)
EOSINOPHILS RELATIVE PERCENT: 2.2 %
GFR AFRICAN AMERICAN: >60
GFR NON-AFRICAN AMERICAN: >60
GLOBULIN: 3 G/DL
GLUCOSE BLD-MCNC: 115 MG/DL (ref 70–99)
HCT VFR BLD CALC: 38.4 % (ref 36–48)
HEMOGLOBIN: 13.2 G/DL (ref 12–16)
LACTIC ACID, SEPSIS: 1 MMOL/L (ref 0.4–1.9)
LYMPHOCYTES ABSOLUTE: 2.3 K/UL (ref 1–5.1)
LYMPHOCYTES RELATIVE PERCENT: 19.1 %
MCH RBC QN AUTO: 31.1 PG (ref 26–34)
MCHC RBC AUTO-ENTMCNC: 34.4 G/DL (ref 31–36)
MCV RBC AUTO: 90.5 FL (ref 80–100)
MONOCYTES ABSOLUTE: 0.7 K/UL (ref 0–1.3)
MONOCYTES RELATIVE PERCENT: 6.3 %
NEUTROPHILS ABSOLUTE: 8.4 K/UL (ref 1.7–7.7)
NEUTROPHILS RELATIVE PERCENT: 71.3 %
PDW BLD-RTO: 13.1 % (ref 12.4–15.4)
PLATELET # BLD: 243 K/UL (ref 135–450)
PMV BLD AUTO: 9 FL (ref 5–10.5)
POTASSIUM REFLEX MAGNESIUM: 3.8 MMOL/L (ref 3.5–5.1)
RBC # BLD: 4.24 M/UL (ref 4–5.2)
SODIUM BLD-SCNC: 138 MMOL/L (ref 136–145)
TOTAL PROTEIN: 7.4 G/DL (ref 6.4–8.2)
WBC # BLD: 11.8 K/UL (ref 4–11)

## 2021-08-18 PROCEDURE — 94761 N-INVAS EAR/PLS OXIMETRY MLT: CPT

## 2021-08-18 PROCEDURE — 6360000004 HC RX CONTRAST MEDICATION: Performed by: PHYSICIAN ASSISTANT

## 2021-08-18 PROCEDURE — 96365 THER/PROPH/DIAG IV INF INIT: CPT

## 2021-08-18 PROCEDURE — 96375 TX/PRO/DX INJ NEW DRUG ADDON: CPT

## 2021-08-18 PROCEDURE — 1200000000 HC SEMI PRIVATE

## 2021-08-18 PROCEDURE — 2580000003 HC RX 258: Performed by: PHYSICIAN ASSISTANT

## 2021-08-18 PROCEDURE — 87040 BLOOD CULTURE FOR BACTERIA: CPT

## 2021-08-18 PROCEDURE — 6360000002 HC RX W HCPCS: Performed by: PHYSICIAN ASSISTANT

## 2021-08-18 PROCEDURE — 80053 COMPREHEN METABOLIC PANEL: CPT

## 2021-08-18 PROCEDURE — 6370000000 HC RX 637 (ALT 250 FOR IP): Performed by: INTERNAL MEDICINE

## 2021-08-18 PROCEDURE — 99284 EMERGENCY DEPT VISIT MOD MDM: CPT

## 2021-08-18 PROCEDURE — 99222 1ST HOSP IP/OBS MODERATE 55: CPT | Performed by: PHYSICIAN ASSISTANT

## 2021-08-18 PROCEDURE — 6370000000 HC RX 637 (ALT 250 FOR IP): Performed by: PHYSICIAN ASSISTANT

## 2021-08-18 PROCEDURE — 85025 COMPLETE CBC W/AUTO DIFF WBC: CPT

## 2021-08-18 PROCEDURE — 70491 CT SOFT TISSUE NECK W/DYE: CPT

## 2021-08-18 PROCEDURE — 83605 ASSAY OF LACTIC ACID: CPT

## 2021-08-18 PROCEDURE — 94640 AIRWAY INHALATION TREATMENT: CPT

## 2021-08-18 RX ORDER — SODIUM CHLORIDE 9 MG/ML
25 INJECTION, SOLUTION INTRAVENOUS PRN
Status: DISCONTINUED | OUTPATIENT
Start: 2021-08-18 | End: 2021-08-19 | Stop reason: HOSPADM

## 2021-08-18 RX ORDER — POLYETHYLENE GLYCOL 3350 17 G/17G
17 POWDER, FOR SOLUTION ORAL DAILY PRN
Status: DISCONTINUED | OUTPATIENT
Start: 2021-08-18 | End: 2021-08-19 | Stop reason: HOSPADM

## 2021-08-18 RX ORDER — CYCLOBENZAPRINE HCL 10 MG
10 TABLET ORAL NIGHTLY PRN
Status: DISCONTINUED | OUTPATIENT
Start: 2021-08-18 | End: 2021-08-19 | Stop reason: HOSPADM

## 2021-08-18 RX ORDER — ACETAMINOPHEN 325 MG/1
650 TABLET ORAL EVERY 6 HOURS PRN
Status: DISCONTINUED | OUTPATIENT
Start: 2021-08-18 | End: 2021-08-19 | Stop reason: HOSPADM

## 2021-08-18 RX ORDER — KETOROLAC TROMETHAMINE 30 MG/ML
15 INJECTION, SOLUTION INTRAMUSCULAR; INTRAVENOUS ONCE
Status: COMPLETED | OUTPATIENT
Start: 2021-08-18 | End: 2021-08-18

## 2021-08-18 RX ORDER — ALBUTEROL SULFATE 90 UG/1
2 AEROSOL, METERED RESPIRATORY (INHALATION) EVERY 4 HOURS
Status: DISCONTINUED | OUTPATIENT
Start: 2021-08-18 | End: 2021-08-18

## 2021-08-18 RX ORDER — 0.9 % SODIUM CHLORIDE 0.9 %
1000 INTRAVENOUS SOLUTION INTRAVENOUS ONCE
Status: COMPLETED | OUTPATIENT
Start: 2021-08-18 | End: 2021-08-18

## 2021-08-18 RX ORDER — ONDANSETRON 2 MG/ML
4 INJECTION INTRAMUSCULAR; INTRAVENOUS EVERY 6 HOURS PRN
Status: DISCONTINUED | OUTPATIENT
Start: 2021-08-18 | End: 2021-08-19 | Stop reason: HOSPADM

## 2021-08-18 RX ORDER — SODIUM CHLORIDE 0.9 % (FLUSH) 0.9 %
5-40 SYRINGE (ML) INJECTION EVERY 12 HOURS SCHEDULED
Status: DISCONTINUED | OUTPATIENT
Start: 2021-08-18 | End: 2021-08-19 | Stop reason: HOSPADM

## 2021-08-18 RX ORDER — ALBUTEROL SULFATE 90 UG/1
2 AEROSOL, METERED RESPIRATORY (INHALATION) EVERY 4 HOURS PRN
Status: DISCONTINUED | OUTPATIENT
Start: 2021-08-18 | End: 2021-08-19 | Stop reason: HOSPADM

## 2021-08-18 RX ORDER — ACETAMINOPHEN 650 MG/1
650 SUPPOSITORY RECTAL EVERY 6 HOURS PRN
Status: DISCONTINUED | OUTPATIENT
Start: 2021-08-18 | End: 2021-08-19 | Stop reason: HOSPADM

## 2021-08-18 RX ORDER — SODIUM CHLORIDE 0.9 % (FLUSH) 0.9 %
5-40 SYRINGE (ML) INJECTION PRN
Status: DISCONTINUED | OUTPATIENT
Start: 2021-08-18 | End: 2021-08-19 | Stop reason: HOSPADM

## 2021-08-18 RX ORDER — ONDANSETRON 2 MG/ML
4 INJECTION INTRAMUSCULAR; INTRAVENOUS ONCE
Status: COMPLETED | OUTPATIENT
Start: 2021-08-18 | End: 2021-08-18

## 2021-08-18 RX ORDER — HYDROCODONE BITARTRATE AND ACETAMINOPHEN 5; 325 MG/1; MG/1
1 TABLET ORAL EVERY 8 HOURS PRN
Status: DISCONTINUED | OUTPATIENT
Start: 2021-08-18 | End: 2021-08-19 | Stop reason: HOSPADM

## 2021-08-18 RX ORDER — ONDANSETRON 4 MG/1
4 TABLET, ORALLY DISINTEGRATING ORAL EVERY 8 HOURS PRN
Status: DISCONTINUED | OUTPATIENT
Start: 2021-08-18 | End: 2021-08-19 | Stop reason: HOSPADM

## 2021-08-18 RX ADMIN — SODIUM CHLORIDE 1000 ML: 9 INJECTION, SOLUTION INTRAVENOUS at 16:03

## 2021-08-18 RX ADMIN — ONDANSETRON HYDROCHLORIDE 4 MG: 2 INJECTION, SOLUTION INTRAMUSCULAR; INTRAVENOUS at 16:04

## 2021-08-18 RX ADMIN — HYDROCODONE BITARTRATE AND ACETAMINOPHEN 1 TABLET: 5; 325 TABLET ORAL at 21:15

## 2021-08-18 RX ADMIN — DICLOFENAC SODIUM 1 G: 10 GEL TOPICAL at 21:15

## 2021-08-18 RX ADMIN — PIPERACILLIN AND TAZOBACTAM 3375 MG: 3; .375 INJECTION, POWDER, FOR SOLUTION INTRAVENOUS at 16:35

## 2021-08-18 RX ADMIN — Medication 10 ML: at 21:15

## 2021-08-18 RX ADMIN — KETOROLAC TROMETHAMINE 15 MG: 30 INJECTION, SOLUTION INTRAMUSCULAR; INTRAVENOUS at 16:04

## 2021-08-18 RX ADMIN — Medication 2 PUFF: at 19:50

## 2021-08-18 RX ADMIN — CEFEPIME 2000 MG: 2 INJECTION, POWDER, FOR SOLUTION INTRAVENOUS at 21:21

## 2021-08-18 RX ADMIN — IOPAMIDOL 75 ML: 755 INJECTION, SOLUTION INTRAVENOUS at 16:38

## 2021-08-18 ASSESSMENT — PAIN SCALES - GENERAL
PAINLEVEL_OUTOF10: 7
PAINLEVEL_OUTOF10: 4
PAINLEVEL_OUTOF10: 4
PAINLEVEL_OUTOF10: 8
PAINLEVEL_OUTOF10: 7

## 2021-08-18 ASSESSMENT — PAIN DESCRIPTION - LOCATION: LOCATION: EAR

## 2021-08-18 ASSESSMENT — PAIN DESCRIPTION - PAIN TYPE: TYPE: ACUTE PAIN

## 2021-08-18 ASSESSMENT — PAIN DESCRIPTION - ORIENTATION: ORIENTATION: RIGHT

## 2021-08-18 NOTE — PLAN OF CARE
Problem: Pain:  Goal: Pain level will decrease  Description: Pain level will decrease  Outcome: Ongoing  Goal: Control of acute pain  Description: Control of acute pain  Outcome: Ongoing  Goal: Control of chronic pain  Description: Control of chronic pain  Outcome: Ongoing  Goal: Patient's pain/discomfort is manageable  Description: Patient's pain/discomfort is manageable  Outcome: Ongoing     Problem: Infection:  Goal: Will remain free from infection  Description: Will remain free from infection  Outcome: Ongoing

## 2021-08-18 NOTE — ED NOTES
Pt presents to ED w/ spider bite to R ear w/ obvious swelling. Pt reports bite x 1 wk ago, put on antibiotics the 16th w/ no relief. Last night, pain radiated down to R side of neck w/ nausea and vomited x 3. Pt reports feeling light headed and dizzy today.       Fransico Huang RN  08/18/21 5554

## 2021-08-18 NOTE — ED PROVIDER NOTES
Emergency Department Provider Note     Location: [place] [address] ED  8/18/2021    I independently performed a history and physical on [address] [facility]. All diagnostic, treatment, and disposition decisions were made by myself in conjunction with the mid-level provider. Briefly, this is a 55 y.o. female here for right ear pain. Patient woke up 2 days ago with right ear pain and believed she was bit by a spider. She presented to [address] ED and was prescribed Bactrim and Keflex. Patient states she has been taking her antibiotics as prescribed and did not miss a dose. Now her right ear is swollen and more painful. Pain is also starting to radiate into the right side of her neck. ED Triage Vitals [08/18/21 1445]   BP Temp Temp Source Pulse Resp SpO2 Height Weight   (!) 145/82 97.7 °F (36.5 °C) Temporal 77 18 99 % 5' 7\" (1.702 m) 185 lb (83.9 kg)        Exam showed WDWN female NAD, ACOx3, external ear, including the cartilage portion is swollen and very tender to touch. TM normal.  Periarticular soft tissue and right side of the neck also tender to touch. No obvious cervical adenopathy appreciated but exam limited because patient could not tolerate palpation. No submandibular fullness. No elevated tongue. No tracheal deviation. Patient seen and examined in room 12. Radiology  CT SOFT TISSUE NECK W CONTRAST    Result Date: 8/18/2021  EXAMINATION: CT OF THE NECK SOFT TISSUE WITH CONTRAST  8/18/2021 TECHNIQUE: CT of the neck was performed with the administration of intravenous contrast. Multiplanar reformatted images are provided for review. Dose modulation, iterative reconstruction, and/or weight based adjustment of the mA/kV was utilized to reduce the radiation dose to as low as reasonably achievable. COMPARISON: None.  HISTORY: ORDERING SYSTEM PROVIDED HISTORY: right ear pain and swelling, pain behind ear and right neck TECHNOLOGIST PROVIDED HISTORY: Reason for exam:->right ear pain and swelling, pain behind ear and right neck Decision Support Exception - unselect if not a suspected or confirmed emergency medical condition->Emergency Medical Condition (MA) Reason for Exam: right ear pain and swelling, pain behind ear and right neck FINDINGS: PHARYNX/LARYNX:  The palatine tonsils are normal in appearance. The tongue is normal in appearance. The valleculae, epiglottis, aryepiglottic folds and pyriform sinuses appear unremarkable. The true and false vocal cords are normal in appearance. No mass or abscess is seen. SALIVARY GLANDS/THYROID:  The parotid and submandibular glands appear unremarkable. The thyroid gland appears unremarkable. LYMPH NODES:  No cervical or supraclavicular lymphadenopathy is seen. SOFT TISSUES:  No there is a right pin abscess measuring 0.9 cm AP x 1.1 cm transverse by 1 cm craniocaudal causing deformity of the pinna. There is no extension of inflammatory changes beyond the right pinna. BRAIN/ORBITS/SINUSES:  The visualized portion of the intracranial contents appear unremarkable. The visualized portion of the orbits, paranasal sinuses and mastoid air cells demonstrate no acute abnormality. LUNG APICES/SUPERIOR MEDIASTINUM:  No focal consolidation is seen within the visualized lung apices. No superior mediastinal lymphadenopathy or mass. The visualized portion of the trachea appears unremarkable. BONES:  No aggressive appearing lytic or blastic bony lesion. No acute abnormality of the soft tissue structures of the neck. .  Right pinna abscess noted measuring 0.9 x 1.1 x 1.0 cm in size. . There is no evidence for extension of inflammatory changes beyond the boundaries of the pinna         Lab reviewed. Pertinent for WBC 11.8, lactic acid 1.0. Renal panel unremarkable    55 y.o. F here for an infection of the right ear that has progressed despite outpatient antibiotics. CT today confirmed an abscess in the pinna. We will start IV antibiotics and admit.      I spoke with NP for hospitalist team. We thoroughly discussed the history, physical exam, laboratory and imaging studies, as well as, emergency department course. Based upon that discussion, we've decided to admit Melvin Roper for further observation and evaluation of Mercy Lea's right ear soft tissue infection and abscess. Patient will also need ENT consult as inpatient and hospitalist confirmed that ENT rounds on patient at [facility]. As I have deemed necessary from their history, physical, and studies, I have considered and evaluated Melvin Roper for the following diagnoses: DEEP SPACE INFECTIONS, DEEP VENOUS THROMBOSIS, JOSE ANTONIO'S GANGRENE, SEPSIS, and TOXIC SHOCK SYNDROME. For further details of [facility] emergency department encounter, please see CARLIE Polo's documentation. This chart was generated in part by using Dragon Dictation system and may contain errors related to that system including errors in grammar, punctuation, and spelling, as well as words and phrases that may be inappropriate. If there are any questions or concerns please feel free to contact the dictating provider for clarification.           Negin Mc MD  08/18/21 7870

## 2021-08-18 NOTE — H&P
Hospital Medicine History & Physical      PCP: Amber Thomas    Date of Admission: 2021    Date of Service: Pt seen/examined on 2021     Chief Complaint:    Chief Complaint   Patient presents with    Wound Infection     patient reports being bit by a spider and was placed on antibiotics. Patient reports infection is spreading and pain increased         History Of Present Illness: The patient is a 55 y.o. female with asthma, chronic back pain, tobacco dependence who presented to [facility] ED with complaint of right ear pain. She states about 3 days ago she woke up and noticed a painful red spot on her right inner ear. She thought she was bit by a spider. She went to the ER for evaluation was started on Bactrim and Keflex for possible infection. She has been taking that as prescribed. Over the past 2 days the area has gotten larger, swollen, very tender to touch. She has felt nauseated. She has not had any fevers. Denies any vomiting. Return to the ER today where she was found to have an abscess of the right pinna of the ear. She was started on IV antibiotics and will be admitted for further care and ENT consultation    Past Medical History:        Diagnosis Date    Asthma     MRSA (methicillin resistant staph aureus) culture positive 1/11/15    throat    Obesity        Past Surgical History:        Procedure Laterality Date     SECTION      HYSTERECTOMY      TUBAL LIGATION         Medications Prior to Admission:    Prior to Admission medications    Medication Sig Start Date End Date Taking? Authorizing Provider   HYDROcodone-acetaminophen (NORCO) 5-325 MG per tablet Take 1 tablet by mouth every 8 hours as needed for Pain.      Historical Provider, MD   diclofenac sodium (VOLTAREN) 1 % GEL Apply 1 g topically every 12 hours 8/10/21   Historical Provider, MD   cyclobenzaprine (FLEXERIL) 10 MG tablet Take 1 tablet by mouth nightly as needed 21   Historical Provider, MD sulfamethoxazole-trimethoprim (BACTRIM DS) 800-160 MG per tablet Take 1 tablet by mouth 2 times daily for 10 days 8/16/21 8/26/21  Ronda Guzman PA-C   cephALEXin [facility]) 500 MG capsule Take 1 capsule by mouth 4 times daily for 10 days 8/16/21 8/26/21  Ronda Guzman PA-C   ibuprofen (IBU) 800 MG tablet Take 1 tablet by mouth every 8 hours as needed for Pain 8/16/21   Ronda Guzman PA-C   Montelukast Sodium (SINGULAIR PO) Take by mouth    Historical Provider, MD   Cetirizine HCl (ZYRTEC PO) Take by mouth    Historical Provider, MD   Mometasone Furoate (NASONEX NA) by Nasal route    Historical Provider, MD   VITAMIN D, CHOLECALCIFEROL, PO Take by mouth    Historical Provider, MD   albuterol sulfate  (90 Base) MCG/ACT inhaler Inhale 2 puffs into the lungs every 4 hours 12/22/19   Cecy Hua DO       Allergies: Avelox [moxifloxacin], Ultram [tramadol hcl], and Codeine    Social History:  The patient currently lives at home with family    TOBACCO:   reports that she has been smoking cigarettes. She has a 22.00 pack-year smoking history. She has never used smokeless tobacco.  ETOH:   reports no history of alcohol use. Family History:   Positive as follows:    History reviewed. No pertinent family history.     REVIEW OF SYSTEMS:       Constitutional: Negative for fever   HENT: Negative for sore throat   + ear pain/swelling   Eyes: Negative for redness   Respiratory: Negative  for dyspnea, cough   Cardiovascular: Negative for chest pain   Gastrointestinal: Negative for vomiting, diarrhea   Genitourinary: Negative for hematuria   Musculoskeletal: Negative for arthralgias   Skin: Negative for rash   Neurological: Negative for syncope   Hematological: Negative for adenopathy   Psychiatric/Behavorial: Negative for anxiety    PHYSICAL EXAM:    /70   Pulse 67   Temp 97.7 °F (36.5 °C) (Temporal)   Resp 14   Ht 5' 7\" (1.702 m)   Wt 185 lb (83.9 kg)   LMP 11/16/2014   SpO2 100%   BMI 28.98 kg/m²     Gen: No distress. Alert. Eyes: PERRL. No sclera icterus. No conjunctival injection. ENT: No discharge. Pharynx clear. Right TM normal  Right pinna with area of erythema and induration  No LAD in neck appreciated   Media Information     Document Information    Wound      08/18/2021 18:05   Attached To: Hospital Encounter on 8/18/21   [address] [place] [address] Emergency Dept     Neck: No JVD. Trachea midline. Soft and supple   Resp: No accessory muscle use. No crackles. No wheezes. No rhonchi. CV: Regular rate. Regular rhythm. No murmur. No rub. No edema. GI: Non-tender. Non-distended. No masses. No organomegaly. Normal bowel sounds. No hernia. Skin: Warm and dry. No nodule on exposed extremities. No rash on exposed extremities. M/S: No cyanosis. No joint deformity. No clubbing. Neuro: Awake. Grossly nonfocal    Psych: Oriented x 3. No anxiety or agitation. CBC:   Recent Labs     08/18/21  1450   WBC 11.8*   HGB 13.2   HCT 38.4   MCV 90.5        BMP:   Recent Labs     08/18/21  1450      K 3.8      CO2 22   BUN 4*   CREATININE 0.6     LIVER PROFILE:   Recent Labs     08/18/21  1450   AST 16   ALT 13   BILITOT <0.2   ALKPHOS 79     PT/INR: No results for input(s): PROTIME, INR in the last 72 hours. APTT: No results for input(s): APTT in the last 72 hours. UA:No results for input(s): NITRITE, COLORU, PHUR, LABCAST, WBCUA, RBCUA, MUCUS, TRICHOMONAS, YEAST, BACTERIA, CLARITYU, SPECGRAV, LEUKOCYTESUR, UROBILINOGEN, BILIRUBINUR, BLOODU, GLUCOSEU, AMORPHOUS in the last 72 hours. Invalid input(s): KETONESU       CARDIAC ENZYMES  No results for input(s): CKTOTAL, CKMB, CKMBINDEX, TROPONINI in the last 72 hours.     U/A:    Lab Results   Component Value Date    COLORU Yellow 12/13/2020    WBCUA 20-50 08/05/2017    RBCUA 0-2 08/05/2017    MUCUS Rare 08/05/2017    BACTERIA Rare 08/05/2017    CLARITYU Clear 12/13/2020    SPECGRAV 1.015 12/13/2020

## 2021-08-18 NOTE — ED PROVIDER NOTES
[address] 50        Pt Name: Marita Wong  MRN: [id number]  [place] 1975  Date of evaluation: 8/18/2021  Provider: Stephanie Anthony PA-C  PCP: Roxanna Salter    Shared Visit or Autonomous Visit:  I have seen and evaluated this patient with my supervising physician Geoffrey Webster MD.    [address] [place] [address]       Chief Complaint   Patient presents with    Wound Infection     patient reports being bit by a spider and was placed on antibiotics. Patient reports infection is spreading and pain increased       HISTORY OF PRESENT ILLNESS   (Location/Symptom, Timing/Onset, Context/Setting, Quality, Duration, Modifying Factors, Severity)  Note limiting factors. Marita Wong is a 55 y.o. female presenting to the emergency department for evaluation of right ear pain and swelling. Symptoms originally started about a week ago states thinks she was bit by a spider had swelling to the inside of her ear, was seen at [facility] ER 2 days ago she was placed on Bactrim and Keflex states symptoms worsening swelling worsening going up her ear and now also pain behind her ear and into her neck and she has vomited 3 times today. No fevers. The history is provided by the patient. Ear Problem  Location:  Right  Onset quality:  Gradual  Duration:  1 week  Timing:  Constant  Progression:  Worsening  Chronicity:  New  Associated symptoms: vomiting    Associated symptoms: no abdominal pain, no ear discharge and no fever        Nursing Notes were reviewed    REVIEW OF SYSTEMS    (2-9 systems for level 4, 10 or more for level 5)     Review of Systems   Constitutional: Negative for fever. HENT: Positive for ear pain. Negative for ear discharge. Right ear swelling   Respiratory: Negative for shortness of breath. Cardiovascular: Negative for chest pain. Gastrointestinal: Positive for nausea and vomiting. Negative for abdominal pain.    All other systems reviewed Current Every Day Smoker     Packs/day: 1.00     Years: 22.00     Pack years: 22.00     Types: Cigarettes    Smokeless tobacco: Never Used   Vaping Use    Vaping Use: Never used   Substance and Sexual Activity    Alcohol use: No    Drug use: No    Sexual activity: Yes     Partners: Male   Other Topics Concern    None   Social History Narrative    None     Social Determinants of Health     Financial Resource Strain:     Difficulty of Paying Living Expenses:    Food Insecurity:     Worried About Running Out of Food in the Last Year:     Ran Out of Food in the Last Year:    Transportation Needs:     Lack of Transportation (Medical):  Lack of Transportation (Non-Medical):    Physical Activity:     Days of Exercise per Week:     Minutes of Exercise per Session:    Stress:     Feeling of Stress :    Social Connections:     Frequency of Communication with Friends and Family:     Frequency of Social Gatherings with Friends and Family:     Attends Quaker Services:     Active Member of Clubs or Organizations:     Attends Club or Organization Meetings:     Marital Status:    Intimate Partner Violence:     Fear of Current or Ex-Partner:     Emotionally Abused:     Physically Abused:     Sexually Abused:        SCREENINGS    Luis Coma Scale  Eye Opening: Spontaneous  Best Verbal Response: Oriented  Best Motor Response: Obeys commands  [place] Coma Scale Score: 15        PHYSICAL EXAM    (up to 7 for level 4, 8 or more for level 5)     ED Triage Vitals [08/18/21 1445]   BP Temp Temp Source Pulse Resp SpO2 Height Weight   (!) 145/82 97.7 °F (36.5 °C) Temporal 77 18 99 % 5' 7\" (1.702 m) 185 lb (83.9 kg)       Physical Exam  Vitals and nursing note reviewed. Constitutional:       Appearance: She is well-developed. She is not toxic-appearing. HENT:      Head: Normocephalic and atraumatic.       Right Ear: Tympanic membrane and ear canal normal.      Left Ear: Tympanic membrane and ear canal normal.      Ears:      Comments: Tenderness and swelling right external ear, tenderness auricle and posterior ear. No crepitus. TM appears normal without erythema, no canal discharge or swelling. Mouth/Throat:      Mouth: Mucous membranes are moist.      Pharynx: Oropharynx is clear. No pharyngeal swelling, oropharyngeal exudate or posterior oropharyngeal erythema. Eyes:      Conjunctiva/sclera: Conjunctivae normal.      Pupils: Pupils are equal, round, and reactive to light. Neck:      Vascular: No JVD. Comments: Tenderness right anterolateral neck, no obvious neck swelling. No erythema. Trachea midline. Normal voice. Cardiovascular:      Rate and Rhythm: Normal rate and regular rhythm. Pulmonary:      Effort: Pulmonary effort is normal. No respiratory distress. Breath sounds: Normal breath sounds. No stridor. No wheezing, rhonchi or rales. Abdominal:      General: Bowel sounds are normal. There is no distension. Palpations: Abdomen is soft. Abdomen is not rigid. There is no mass. Tenderness: There is no abdominal tenderness. There is no guarding or rebound. Musculoskeletal:         General: Normal range of motion. Cervical back: Normal range of motion and neck supple. Skin:     General: Skin is warm and dry. Findings: No rash. Neurological:      Mental Status: She is alert and oriented to person, place, and time. GCS: GCS eye subscore is 4. GCS verbal subscore is 5. GCS motor subscore is 6. Cranial Nerves: No cranial nerve deficit. Sensory: No sensory deficit. Motor: No abnormal muscle tone.       Coordination: Coordination normal.   Psychiatric:         Behavior: Behavior normal.         DIAGNOSTIC RESULTS   LABS:    Labs Reviewed   CBC WITH AUTO DIFFERENTIAL - Abnormal; Notable for the following components:       Result Value    WBC 11.8 (*)     Neutrophils Absolute 8.4 (*)     All other components within normal limits    Narrative: Performed at:  [place] [address],  [place], [facility]   Phone [phone]   COMPREHENSIVE METABOLIC PANEL W/ REFLEX TO MG FOR LOW K - Abnormal; Notable for the following components:    Glucose 115 (*)     BUN 4 (*)     All other components within normal limits    Narrative:     Performed at:  [place] [address],  [employer], [facility]   Phone [phone]   CULTURE, BLOOD 2   CULTURE, BLOOD 1   LACTATE, SEPSIS    Narrative:     Performed at:  [place] [address],  [employer], [facility]   Phone [phone]   BASIC METABOLIC PANEL W/ REFLEX TO MG FOR LOW K   CBC WITH AUTO DIFFERENTIAL     Results for orders placed or performed during the hospital encounter of 08/18/21   CBC Auto Differential   Result Value Ref Range    WBC 11.8 (H) 4.0 - 11.0 K/uL    RBC 4.24 4.00 - 5.20 M/uL    Hemoglobin 13.2 12.0 - 16.0 g/dL    Hematocrit 38.4 36.0 - 48.0 %    MCV 90.5 80.0 - 100.0 fL    MCH 31.1 26.0 - 34.0 pg    MCHC 34.4 31.0 - 36.0 g/dL    RDW 13.1 12.4 - 15.4 %    Platelets 293 160 - 004 K/uL    MPV 9.0 5.0 - 10.5 fL    Neutrophils % 71.3 %    Lymphocytes % 19.1 %    Monocytes % 6.3 %    Eosinophils % 2.2 %    Basophils % 1.1 %    Neutrophils Absolute 8.4 (H) 1.7 - 7.7 K/uL    Lymphocytes Absolute 2.3 1.0 - 5.1 K/uL    Monocytes Absolute 0.7 0.0 - 1.3 K/uL    Eosinophils Absolute 0.3 0.0 - 0.6 K/uL    Basophils Absolute 0.1 0.0 - 0.2 K/uL   Comprehensive Metabolic Panel w/ Reflex to MG   Result Value Ref Range    Sodium 138 136 - 145 mmol/L    Potassium reflex Magnesium 3.8 3.5 - 5.1 mmol/L    Chloride 103 99 - 110 mmol/L    CO2 22 21 - 32 mmol/L    Anion Gap 13 3 - 16    Glucose 115 (H) 70 - 99 mg/dL    BUN 4 (L) 7 - 20 mg/dL    CREATININE 0.6 0.6 - 1.1 mg/dL    GFR Non-African American >60 >60    GFR African American >60 >60    Calcium 9.4 8.3 - 10.6 mg/dL    Total Protein 7.4 6.4 - 8.2 g/dL    Albumin 4.4 3.4 - 5.0 g/dL    Albumin/Globulin Ratio 1.5 1.1 - 2.2    Total Bilirubin <0.2 0.0 - 1.0 mg/dL    Alkaline Phosphatase 79 40 - 129 U/L    ALT 13 10 - 40 U/L    AST 16 15 - 37 U/L    Globulin 3.0 g/dL   Lactate, Sepsis   Result Value Ref Range    Lactic Acid, Sepsis 1.0 0.4 - 1.9 mmol/L       All other labs were within normal range or not returned as of this dictation. EKG: All EKG's are interpreted by the Emergency Department Physician in the absence of a cardiologist.  Please see their note for interpretation of EKG. RADIOLOGY:   Non-plain film images such as CT, Ultrasound and MRI are read by the radiologist. Plain radiographic images are visualized andpreliminarily interpreted by the  ED Provider with the below findings:        Interpretation perthe Radiologist below, if available at the time of this note:    CT SOFT TISSUE NECK W CONTRAST   Final Result   No acute abnormality of the soft tissue structures of the neck. .  Right pinna   abscess noted measuring 0.9 x 1.1 x 1.0 cm in size. .      There is no evidence for extension of inflammatory changes beyond the   boundaries of the pinna           CT SOFT TISSUE NECK W CONTRAST    Result Date: 8/18/2021  EXAMINATION: CT OF THE NECK SOFT TISSUE WITH CONTRAST  8/18/2021 TECHNIQUE: CT of the neck was performed with the administration of intravenous contrast. Multiplanar reformatted images are provided for review. Dose modulation, iterative reconstruction, and/or weight based adjustment of the mA/kV was utilized to reduce the radiation dose to as low as reasonably achievable. COMPARISON: None.  HISTORY: ORDERING SYSTEM PROVIDED HISTORY: right ear pain and swelling, pain behind ear and right neck TECHNOLOGIST PROVIDED HISTORY: Reason for exam:->right ear pain and swelling, pain behind ear and right neck Decision Support Exception - unselect if not a suspected or confirmed emergency medical condition->Emergency Medical Condition (MA) Reason for Exam: right ear pain and swelling, pain behind ear and right neck FINDINGS: PHARYNX/LARYNX:  The palatine tonsils are normal in appearance. The tongue is normal in appearance. The valleculae, epiglottis, aryepiglottic folds and pyriform sinuses appear unremarkable. The true and false vocal cords are normal in appearance. No mass or abscess is seen. SALIVARY GLANDS/THYROID:  The parotid and submandibular glands appear unremarkable. The thyroid gland appears unremarkable. LYMPH NODES:  No cervical or supraclavicular lymphadenopathy is seen. SOFT TISSUES:  No there is a right pin abscess measuring 0.9 cm AP x 1.1 cm transverse by 1 cm craniocaudal causing deformity of the pinna. There is no extension of inflammatory changes beyond the right pinna. BRAIN/ORBITS/SINUSES:  The visualized portion of the intracranial contents appear unremarkable. The visualized portion of the orbits, paranasal sinuses and mastoid air cells demonstrate no acute abnormality. LUNG APICES/SUPERIOR MEDIASTINUM:  No focal consolidation is seen within the visualized lung apices. No superior mediastinal lymphadenopathy or mass. The visualized portion of the trachea appears unremarkable. BONES:  No aggressive appearing lytic or blastic bony lesion. No acute abnormality of the soft tissue structures of the neck. .  Right pinna abscess noted measuring 0.9 x 1.1 x 1.0 cm in size. . There is no evidence for extension of inflammatory changes beyond the boundaries of the pinna         PROCEDURES   Unless otherwise noted below, none     Procedures    CRITICAL CARE TIME   Critical care provided for this patient of which 0 min were spend on critical care and decision making. 0 min spent on procedures. There was imminent failure of an organ system which required critical intervention to prevent clinically significant progression of life threatening deterioration of the patient's condition to the point of disability or death.       CONSULTS:  MICHAEL CONSULT TO OTOLARYNGOLOGY  PHARMACY TO DOSE VANCOMYCIN      EMERGENCY DEPARTMENT COURSE and DIFFERENTIAL DIAGNOSIS/MDM:   Vitals:    Vitals:    08/18/21 1544 08/18/21 1734 08/18/21 1845 08/18/21 1953   BP: (!) 133/92 124/70 121/77    Pulse: 76 67 61    Resp: 18 14 16 18   Temp:   97.5 °F (36.4 °C)    TempSrc:   Oral    SpO2: 100% 100% 95% 98%   Weight:       Height:           Patient was given thefollowing medications:  Medications   cyclobenzaprine (FLEXERIL) tablet 10 mg (has no administration in time range)   diclofenac sodium (VOLTAREN) 1 % gel 1 g (1 g Topical Given 8/18/21 2115)   HYDROcodone-acetaminophen (NORCO) 5-325 MG per tablet 1 tablet (1 tablet Oral Given 8/18/21 2115)   sodium chloride flush 0.9 % injection 5-40 mL (10 mLs Intravenous Given 8/18/21 2115)   sodium chloride flush 0.9 % injection 5-40 mL (has no administration in time range)   0.9 % sodium chloride infusion (has no administration in time range)   enoxaparin (LOVENOX) injection 40 mg (40 mg Subcutaneous Not Given 8/18/21 2122)   ondansetron (ZOFRAN-ODT) disintegrating tablet 4 mg (has no administration in time range)     Or   ondansetron (ZOFRAN) injection 4 mg (has no administration in time range)   polyethylene glycol (GLYCOLAX) packet 17 g (has no administration in time range)   acetaminophen (TYLENOL) tablet 650 mg (has no administration in time range)     Or   acetaminophen (TYLENOL) suppository 650 mg (has no administration in time range)   cefepime (MAXIPIME) 2000 mg IVPB minibag (0 mg Intravenous Stopped 8/18/21 2214)   COVID-19 Ad26 Vaccine (J&J, NEHEMIAS) injection 0.5 mL (has no administration in time range)   vancomycin 1000 mg IVPB in 250 mL D5W addavial (has no administration in time range)   albuterol sulfate  (90 Base) MCG/ACT inhaler 2 puff (has no administration in time range)   nicotine (NICODERM CQ) 21 MG/24HR 1 patch (has no administration in time range)   nicotine polacrilex (NICORETTE) gum 2 mg (has no administration in time range)   ondansetron (ZOFRAN) injection 4 mg (4 mg Intravenous Given 8/18/21 1604)   0.9 % sodium chloride bolus (0 mLs Intravenous Stopped 8/18/21 1633)   ketorolac (TORADOL) injection 15 mg (15 mg Intravenous Given 8/18/21 1604)   vancomycin 1000 mg IVPB in 250 mL D5W addavial (0 mg Intravenous Stopped 8/18/21 1847)   piperacillin-tazobactam (ZOSYN) 3,375 mg in sodium chloride 0.9 % 100 mL IVPB (mini-bag) (0 mg Intravenous Stopped 8/18/21 1705)   iopamidol (ISOVUE-370) 76 % injection 75 mL (75 mLs Intravenous Given 8/18/21 1638)       ED course  Patient presented to the ER for evaluation of worsening right ear pain and swelling despite taking Bactrim and Keflex. On exam she has tenderness to the external ear auricle with increased swelling. Concern for cartilage infection. Given IV vancomycin and Zosyn. CT scan was obtained showing abscess of the pinna. Plan for admission. FINAL IMPRESSION      1. Chondritis    2. Pinna abscess right ear    DISPOSITION/PLAN   DISPOSITION Admitted    PATIENT REFERREDTO:  No follow-up provider specified.     DISCHARGE MEDICATIONS:  Current Discharge Medication List          DISCONTINUED MEDICATIONS:  Current Discharge Medication List                 (Please note that portions ofthis note were completed with a voice recognition program.  Efforts were made to edit the dictations but occasionally words are mis-transcribed.)    Amira Candelaria PA-C (electronically signed)            Saumya Lazo PA-C  08/19/21 8125

## 2021-08-18 NOTE — FLOWSHEET NOTE
08/18/21 1845   Vital Signs   Temp 97.5 °F (36.4 °C)   Temp Source Oral   Pulse 61   Heart Rate Source Monitor   Resp 16   /77   BP Location Left upper arm   Patient Position Sitting   Level of Consciousness Alert (0)   MEWS Score 1   Patient Currently in Pain Yes   Oxygen Therapy   SpO2 95 %   O2 Device None (Room air)       Patient arrived to the unit via wheelchair in stable condition. Orders released. Box lunch given. Admission questions completed.  HT and 4 eyes will be done by oncoming RN

## 2021-08-18 NOTE — CONSULTS
Pharmacy Note  Vancomycin Consult    Farooq Diaz is a 55 y.o. female started on Vancomycin for wound infection; consult received from Janice SEXTON to manage therapy. Also receiving the following antibiotics: cefepime. Patient Active Problem List   Diagnosis    Abscess of pinna, right    Asthma    Chronic back pain    Tobacco dependence     Allergies: Avelox [moxifloxacin], Ultram [tramadol hcl], and Codeine     Temp max:     Recent Labs     08/18/21  1450   BUN 4*   CREATININE 0.6   WBC 11.8*       Intake/Output Summary (Last 24 hours) at 8/18/2021 1935  Last data filed at 8/18/2021 1705  Gross per 24 hour   Intake 1026.55 ml   Output --   Net 1026.55 ml     Culture Date      Source                       Results      Ht Readings from Last 1 Encounters:   08/18/21 5' 7\" (1.702 m)        Wt Readings from Last 1 Encounters:   08/18/21 185 lb (83.9 kg)       Body mass index is 28.98 kg/m². Estimated Creatinine Clearance: 130 mL/min (based on SCr of 0.6 mg/dL). Goal Trough Level: 15-20 mcg/mL    Assessment/Plan:  Patient received vancomycin 1 gram ivpb x1 dose at 1734 on 8-18-21. Please start vancomycin 1 gram ivpb q12h at 0600 on 8-19-21. Please check vancomycin trough 8-20-21 at 0530. Thank you for the consult. Will continue to follow. [address] [place] [address]. Ph.  8/18/2021 7:37 PM

## 2021-08-19 VITALS
TEMPERATURE: 97.2 F | OXYGEN SATURATION: 95 % | BODY MASS INDEX: 29.03 KG/M2 | HEIGHT: 67 IN | SYSTOLIC BLOOD PRESSURE: 119 MMHG | RESPIRATION RATE: 16 BRPM | HEART RATE: 58 BPM | WEIGHT: 185 LBS | DIASTOLIC BLOOD PRESSURE: 63 MMHG

## 2021-08-19 PROBLEM — H60.01: Status: RESOLVED | Noted: 2021-08-18 | Resolved: 2021-08-19

## 2021-08-19 LAB
ANION GAP SERPL CALCULATED.3IONS-SCNC: 10 MMOL/L (ref 3–16)
BASOPHILS ABSOLUTE: 0.1 K/UL (ref 0–0.2)
BASOPHILS RELATIVE PERCENT: 0.8 %
BUN BLDV-MCNC: 7 MG/DL (ref 7–20)
CALCIUM SERPL-MCNC: 8.4 MG/DL (ref 8.3–10.6)
CHLORIDE BLD-SCNC: 106 MMOL/L (ref 99–110)
CO2: 21 MMOL/L (ref 21–32)
CREAT SERPL-MCNC: <0.5 MG/DL (ref 0.6–1.1)
EOSINOPHILS ABSOLUTE: 0.3 K/UL (ref 0–0.6)
EOSINOPHILS RELATIVE PERCENT: 3.3 %
GFR AFRICAN AMERICAN: >60
GFR NON-AFRICAN AMERICAN: >60
GLUCOSE BLD-MCNC: 115 MG/DL (ref 70–99)
HCT VFR BLD CALC: 35.9 % (ref 36–48)
HEMOGLOBIN: 12.3 G/DL (ref 12–16)
LYMPHOCYTES ABSOLUTE: 2 K/UL (ref 1–5.1)
LYMPHOCYTES RELATIVE PERCENT: 20.3 %
MCH RBC QN AUTO: 31.5 PG (ref 26–34)
MCHC RBC AUTO-ENTMCNC: 34.2 G/DL (ref 31–36)
MCV RBC AUTO: 92.1 FL (ref 80–100)
MONOCYTES ABSOLUTE: 0.6 K/UL (ref 0–1.3)
MONOCYTES RELATIVE PERCENT: 6.6 %
NEUTROPHILS ABSOLUTE: 6.8 K/UL (ref 1.7–7.7)
NEUTROPHILS RELATIVE PERCENT: 69 %
PDW BLD-RTO: 13 % (ref 12.4–15.4)
PLATELET # BLD: 215 K/UL (ref 135–450)
PMV BLD AUTO: 9.5 FL (ref 5–10.5)
POTASSIUM REFLEX MAGNESIUM: 3.9 MMOL/L (ref 3.5–5.1)
RBC # BLD: 3.9 M/UL (ref 4–5.2)
SODIUM BLD-SCNC: 137 MMOL/L (ref 136–145)
WBC # BLD: 9.8 K/UL (ref 4–11)

## 2021-08-19 PROCEDURE — 6370000000 HC RX 637 (ALT 250 FOR IP): Performed by: PHYSICIAN ASSISTANT

## 2021-08-19 PROCEDURE — 99238 HOSP IP/OBS DSCHRG MGMT 30/<: CPT | Performed by: PHYSICIAN ASSISTANT

## 2021-08-19 PROCEDURE — 80048 BASIC METABOLIC PNL TOTAL CA: CPT

## 2021-08-19 PROCEDURE — 36415 COLL VENOUS BLD VENIPUNCTURE: CPT

## 2021-08-19 PROCEDURE — 6360000002 HC RX W HCPCS: Performed by: PHYSICIAN ASSISTANT

## 2021-08-19 PROCEDURE — 85025 COMPLETE CBC W/AUTO DIFF WBC: CPT

## 2021-08-19 PROCEDURE — 6370000000 HC RX 637 (ALT 250 FOR IP): Performed by: PEDIATRICS

## 2021-08-19 PROCEDURE — 2580000003 HC RX 258: Performed by: PHYSICIAN ASSISTANT

## 2021-08-19 PROCEDURE — 99253 IP/OBS CNSLTJ NEW/EST LOW 45: CPT | Performed by: OTOLARYNGOLOGY

## 2021-08-19 PROCEDURE — 0H91XZZ DRAINAGE OF FACE SKIN, EXTERNAL APPROACH: ICD-10-PCS | Performed by: OTOLARYNGOLOGY

## 2021-08-19 PROCEDURE — 69000 DRG XTRNL EAR ABSC/HEM SMPL: CPT | Performed by: OTOLARYNGOLOGY

## 2021-08-19 RX ORDER — NICOTINE 21 MG/24HR
1 PATCH, TRANSDERMAL 24 HOURS TRANSDERMAL DAILY
Status: DISCONTINUED | OUTPATIENT
Start: 2021-08-19 | End: 2021-08-19 | Stop reason: HOSPADM

## 2021-08-19 RX ORDER — NICOTINE 21 MG/24HR
1 PATCH, TRANSDERMAL 24 HOURS TRANSDERMAL ONCE
Status: DISCONTINUED | OUTPATIENT
Start: 2021-08-19 | End: 2021-08-19 | Stop reason: HOSPADM

## 2021-08-19 RX ORDER — AMOXICILLIN AND CLAVULANATE POTASSIUM 875; 125 MG/1; MG/1
1 TABLET, FILM COATED ORAL 2 TIMES DAILY
Qty: 14 TABLET | Refills: 0 | Status: SHIPPED | OUTPATIENT
Start: 2021-08-19 | End: 2021-08-26

## 2021-08-19 RX ADMIN — ONDANSETRON HYDROCHLORIDE 4 MG: 2 INJECTION, SOLUTION INTRAMUSCULAR; INTRAVENOUS at 09:05

## 2021-08-19 RX ADMIN — ENOXAPARIN SODIUM 40 MG: 40 INJECTION SUBCUTANEOUS at 09:05

## 2021-08-19 RX ADMIN — CEFEPIME 2000 MG: 2 INJECTION, POWDER, FOR SOLUTION INTRAVENOUS at 09:12

## 2021-08-19 RX ADMIN — Medication 10 ML: at 09:06

## 2021-08-19 RX ADMIN — HYDROCODONE BITARTRATE AND ACETAMINOPHEN 1 TABLET: 5; 325 TABLET ORAL at 05:21

## 2021-08-19 ASSESSMENT — PAIN SCALES - GENERAL
PAINLEVEL_OUTOF10: 5
PAINLEVEL_OUTOF10: 8

## 2021-08-19 ASSESSMENT — ENCOUNTER SYMPTOMS
ABDOMINAL PAIN: 0
NAUSEA: 1
SHORTNESS OF BREATH: 0
VOMITING: 1

## 2021-08-19 ASSESSMENT — PAIN DESCRIPTION - LOCATION: LOCATION: EAR;NECK

## 2021-08-19 ASSESSMENT — PAIN DESCRIPTION - ORIENTATION: ORIENTATION: RIGHT

## 2021-08-19 NOTE — PROGRESS NOTES
Patient called out stating her arm was hurting/red/swollen. IV site assessment showed infiltration. IV removed and ice pack provided.

## 2021-08-19 NOTE — DISCHARGE SUMMARY
Name:  Preeti Caal  Room:  0229/0229-02  MRN:    [id number]    Discharge Summary      This discharge summary is in conjunction with a complete physical exam done on the day of discharge. Discharging Provider: Joellen Salmon PA-C    Admit: 8/18/2021  Discharge: 8/19/2021     HPI taken from admission H&P:    The patient is a 55 y.o. female with asthma, chronic back pain, tobacco dependence who presented to [facility] ED with complaint of right ear pain. She states about 3 days ago she woke up and noticed a painful red spot on her right inner ear. She thought she was bit by a spider. She went to the ER for evaluation was started on Bactrim and Keflex for possible infection. She has been taking that as prescribed. Over the past 2 days the area has gotten larger, swollen, very tender to touch. She has felt nauseated. She has not had any fevers. Denies any vomiting. Return to the ER today where she was found to have an abscess of the right pinna of the ear. She was started on IV antibiotics and will be admitted for further care and ENT consultation    Diagnoses this Admission and Hospital Course     #Cellulitis of right ear   #Seroma of right ear  - failed OP bactrim and keflex  - see CT below   - received vanco/cefepime D#1  - consulted ENT- bedside I&D performed, serous drainage only expelled. Recommended dc home on Augmentin for 7 more days, f/u in ENT office 1 week      #Chronic back pain  - cont home norco    #Asthma  - no AE     #Tobacco Dependence  -Recommended cessation    Procedures (Please Review Full Report for Details)  Bedside I&D of right ear by Dr Duyen Phillips    ENT      Physical Exam at Discharge:    /63   Pulse 58   Temp 97.2 °F (36.2 °C) (Oral)   Resp 16   Ht 5' 7\" (1.702 m)   Wt 185 lb (83.9 kg)   LMP 11/16/2014   SpO2 95%   BMI 28.98 kg/m²     Gen: No distress. Alert. Eyes: PERRL. No sclera icterus. No conjunctival injection. ENT: No discharge.  Pharynx STOP taking these medications    cephALEXin 500 MG capsule  Commonly known as: Keflex     sulfamethoxazole-trimethoprim 800-160 MG per tablet  Commonly known as: Bactrim DS           Where to Get Your Medications      These medications were sent to [facility], [address], [address] [facility] [address]    Phone: [phone]   · amoxicillin-clavulanate 875-125 MG per tablet           Discharged in stable condition to Home     Follow Up:   Follow up with PCP in 1 week, ENT next week    Emily Serrano PA-C  8/19/2021 11:15 AM

## 2021-08-19 NOTE — PROGRESS NOTES
Handoff report and transfer of care given at bedside to [facility]. Patient in stable condition, denies needs/concerns at this time. Call light within reach.

## 2021-08-19 NOTE — PLAN OF CARE
Problem: Pain:  Description: Pain management should include both nonpharmacologic and pharmacologic interventions.   Goal: Pain level will decrease  Description: Pain level will decrease  8/19/2021 0239 by Tanner Dos Santos RN  Outcome: Ongoing  2/79/5592 7784 by Gail Acosta RN  Outcome: Ongoing  Goal: Control of acute pain  Description: Control of acute pain  8/19/2021 0239 by Tanner Dos Santos RN  Outcome: Ongoing  0/05/3178 7337 by Gail Acosta RN  Outcome: Ongoing  Goal: Control of chronic pain  Description: Control of chronic pain  8/19/2021 0239 by Tanner Dos Santos RN  Outcome: Ongoing  9/57/1755 4758 by Gail Acosta RN  Outcome: Ongoing  Goal: Patient's pain/discomfort is manageable  Description: Patient's pain/discomfort is manageable  8/19/2021 0239 by Tanner Dos Santos RN  Outcome: Ongoing  0/53/0417 5190 by Gail Acosta RN  Outcome: Ongoing     Problem: Infection:  Goal: Will remain free from infection  Description: Will remain free from infection  8/19/2021 0239 by Tanner Dos Santos RN  Outcome: Ongoing  6/23/7404 5225 by Gail Acosta RN  Outcome: Ongoing     Problem: Safety:  Goal: Free from accidental physical injury  Description: Free from accidental physical injury  8/19/2021 0239 by Tanner Dos Santos RN  Outcome: Ongoing  1/82/0207 2625 by Gail Acosta RN  Outcome: Ongoing  Goal: Free from intentional harm  Description: Free from intentional harm  8/19/2021 0239 by Tanner Dos Santos RN  Outcome: Ongoing  7/22/8047 1047 by Gail Acosta RN  Outcome: Ongoing     Problem: Daily Care:  Goal: Daily care needs are met  Description: Daily care needs are met  8/19/2021 0239 by Tanner Dos Santos RN  Outcome: Ongoing  5/58/5911 6903 by Gail Acosta RN  Outcome: Ongoing     Problem: Skin Integrity:  Goal: Skin integrity will stabilize  Description: Skin integrity will stabilize  8/19/2021 0239 by Tanner Dos Santos RN  Outcome: Ongoing  7/12/0870 8401 by Gali Acosta RN  Outcome: Ongoing     Problem: Discharge Planning:  Goal: Patients continuum of care needs are met  Description: Patients continuum of care needs are met  8/19/2021 0239 by Kathleen Hurt RN  Outcome: Ongoing  9/10/4678 8989 by Kim Vieira RN  Outcome: Ongoing

## 2021-08-19 NOTE — PROGRESS NOTES
Discharge instructions completed with patient and IV was removed. Patient requested that a bandage be placed on her right ear to protect from the rain. A gauze bandage was placed by writer. Request for transport was placed. No further needs noted.

## 2021-08-19 NOTE — PROGRESS NOTES
Pt requesting pain medication for ear pain rating 8/10. PRN  pain medication given, see MAR. SR up x2, Call light and bedside table in easy reach. Denies any other needs at this time.

## 2021-08-19 NOTE — PROGRESS NOTES
RESPIRATORY THERAPY ASSESSMENT    Name:  Kate Welch Record Number:  [id number]  Age: 55 y.o. Gender: female  : 1975  Today's Date:  2021  Room:  [address] [place], [place] [address]-    Assessment     Is the patient being admitted for a COPD or Asthma exacerbation? No   (If yes the patient will be seen every 4 hours for the first 24 hours and then reassessed)    Patient Admission Diagnosis      Allergies  Allergies   Allergen Reactions    Avelox [Moxifloxacin] Swelling    Ultram [Tramadol Hcl] Nausea Only    Codeine Rash       Minimum Predicted Vital Capacity:               Actual Vital Capacity:                    Pulmonary History:Asthma  Home Oxygen Therapy:  room air  Home Respiratory Therapy:Albuterol (PATIENT STATES THAT SHE ONLY TAKES IT AS NEEDED.)  Current Respiratory Therapy:  Albuterol Q4H. Treatment Type: MDI  Medications: Albuterol    Respiratory Severity Index(RSI)   Patients with orders for inhalation medications, oxygen, or any therapeutic treatment modality will be placed on Respiratory Protocol. They will be assessed with the first treatment and at least every 72 hours thereafter. The following severity scale will be used to determine frequency of treatment intervention.     Smoking History: Pulmonary Disease or Smoking History, Greater than 15 pack year = 2    Social History  Social History     Tobacco Use    Smoking status: Current Every Day Smoker     Packs/day: 1.00     Years: 22.00     Pack years: 22.00     Types: Cigarettes    Smokeless tobacco: Never Used   Vaping Use    Vaping Use: Never used   Substance Use Topics    Alcohol use: No    Drug use: No       Recent Surgical History: None = 0  Past Surgical History  Past Surgical History:   Procedure Laterality Date     SECTION      HYSTERECTOMY      TUBAL LIGATION         Level of Consciousness: Alert, Oriented, and Cooperative = 0    Level of Activity: Walking unassisted = 0    Respiratory Pattern: Regular Pattern; RR 8-20 = 0    Breath Sounds: Diminshed bilaterally and/or crackles = 2    Sputum   ,  , Sputum How Obtained: Spontaneous cough  Cough: Strong, spontaneous, non-productive = 0    Vital Signs   /77   Pulse 61   Temp 97.5 °F (36.4 °C) (Oral)   Resp 18   Ht 5' 7\" (1.702 m)   Wt 185 lb (83.9 kg)   LMP 11/16/2014   SpO2 98%   BMI 28.98 kg/m²   SPO2 (COPD values may differ): Greater than or equal to 92% on room air = 0    Peak Flow (asthma only): not applicable = 0    RSI: 0-4 = See once and convert to home regimen or discontinue        Plan       Goals: medication delivery    Patient/caregiver was educated on the proper method of use for Respiratory Care Devices:  Yes      Level of patient/caregiver understanding able to:   ? Verbalize understanding   ? Demonstrate understanding       ? Teach back        ? Needs reinforcement       ? No available caregiver               ? Other:     Response to education:  Good     Is patient being placed on Home Treatment Regimen? Yes     Does the patient have everything they need prior to discharge? NA     Comments: Chart reviewed and patient assessed. Plan of Care: Albuterol 2PUFFS Q4PRN. Electronically signed by Nilda Glaser RCP on 8/18/2021 at 9:35 PM    Respiratory Protocol Guidelines     1. Assessment and treatment by Respiratory Therapy will be initiated for medication and therapeutic interventions upon initiation of aerosolized medication. 2. Physician will be contacted for respiratory rate (RR) greater than 35 breaths per minute. Therapy will be held for heart rate (HR) greater than 140 beats per minute, pending direction from physician. 3. Bronchodilators will be administered via Metered Dose Inhaler (MDI) with spacer when the following criteria are met:  a. Alert and cooperative     b. HR < 140 bpm  c. RR < 30 bpm                d. Can demonstrate a 2-3 second inspiratory hold  4.  Bronchodilators will be administered via Hand Held Nebulizer [facility]) to patients when ANY of the following criteria are met  a. Incognizant or uncooperative          b. Patients treated with HHN at Home        c. Unable to demonstrate proper use of MDI with spacer     d. RR > 30 bpm   5. Bronchodilators will be delivered via Metered Dose Inhaler (MDI), HHN, Aerogen to intubated patients on mechanical ventilation. 6. Inhalation medication orders will be delivered and/or substituted as outlined below. Aerosolized Medications Ordering and Administration Guidelines:    1. All Medications will be ordered by a physician, and their frequency and/or modality will be adjusted as defined by the patients Respiratory Severity Index (RSI) score. 2. If the patient does not have documented COPD, consider discontinuing anticholinergics when RSI is less than 9.  3. If the bronchospasm worsens (increased RSI), then the bronchodilator frequency can be increased to a maximum of every 4 hours. If greater than every 4 hours is required, the physician will be contacted. 4. If the bronchospasm improves, the frequency of the bronchodilator can be decreased, based on the patient's RSI, but not less than home treatment regimen frequency. 5. Bronchodilator(s) will be discontinued if patient has a RSI less than 9 and has received no scheduled or as needed treatment for 72  Hrs. Patients Ordered on a Mucolytic Agent:    1. Must always be administered with a bronchodilator. 2. Discontinue if patient experiences worsened bronchospasm, or secretions have lessened to the point that the patient is able to clear them with a cough. Anti-inflammatory and Combination Medications:    1. If the patient lacks prior history of lung disease, is not using inhaled anti-inflammatory medication at home, and lacks wheezing by examination or by history for at least 24 hours, contact physician for possible discontinuation.

## 2021-08-19 NOTE — PROGRESS NOTES
Shift assessment complete; see flow sheet. Scheduled medications administered; See MAR. IV infusing without difficulty. Pt c/o ear pain 8/10 PRN Norco given at this time. Pt requesting a nicotine patch. Betaris jacob MD. Pt denies any other needs at this time.  Pt educated on use of call light and to call out with needs, verbalized understanding, bed in low locked position for pt safety

## 2021-08-19 NOTE — FLOWSHEET NOTE
08/19/21 0800   Vital Signs   Temp 97.2 °F (36.2 °C)   Temp Source Oral   Pulse 58   Heart Rate Source Radial   Resp 16   /63   BP Location Left upper arm   Patient Position Sitting   Level of Consciousness Alert (0)   MEWS Score 1   Patient Currently in Pain Yes   Pain Assessment   Pain Level 5   Pain Location Ear;Neck   Pain Orientation Right       Shift assessment complete. See flow sheet. Scheduled meds given. See MAR. Patients head-toe complete, VS are logged, active bowel sound noted in all four quadrants. Patients ear I&D at bedside by Dr. Karen Blackwell. Patient was given a general diet and told she would be discharged. No needs are noted at this time. Call light and bedside table are within reach. The bed is locked and is in the lowest position. Kinjal Mas RN

## 2021-08-19 NOTE — ACP (ADVANCE CARE PLANNING)
Advance Care Planning   Healthcare Decision Maker:    Primary Decision Maker: [facility] - [phone]    Secondary Decision Maker: Lindsey Gaffney - [phone]    Click here to complete Healthcare Decision Makers including selection of the Healthcare Decision Maker Relationship (ie \"Primary\").

## 2021-08-19 NOTE — CONSULTS
Juan C      Patient Name: Kate Welch Record Number:  [id number]  Primary Care Physician:  Nusrat Garcia  Date of Consultation: 2021    Chief Complaint: Right ear pain    HISTORY OF PRESENT ILLNESS  Gilford Reason is a(n) 55 y.o. female who presented to the hospital with right ear pain that has progressively worsening over the last week. She believes it all began with a spider bite. Reports initially she was able to pop it had some pus and fluid but then area of swelling increased. Reports ear became very red and swollen presented to the ED was admitted for IV antibiotics. CT scan demonstrated abscess of the right pinna. ENT consulted. No history of MRSA. Patient Active Problem List   Diagnosis    Abscess of pinna, right    Asthma    Chronic back pain    Tobacco dependence     Past Surgical History:   Procedure Laterality Date     SECTION      HYSTERECTOMY      TUBAL LIGATION       History reviewed. No pertinent family history. DRUG/FOOD ALLERGIES: Avelox [moxifloxacin], Ultram [tramadol hcl], and Codeine    CURRENT MEDICATIONS  Prior to Admission medications    Medication Sig Start Date End Date Taking? Authorizing Provider   HYDROcodone-acetaminophen (NORCO) 5-325 MG per tablet Take 1 tablet by mouth every 8 hours as needed for Pain.      Historical Provider, MD   diclofenac sodium (VOLTAREN) 1 % GEL Apply 1 g topically every 12 hours 8/10/21   Historical Provider, MD   cyclobenzaprine (FLEXERIL) 10 MG tablet Take 1 tablet by mouth nightly as needed 21   Historical Provider, MD   sulfamethoxazole-trimethoprim (BACTRIM DS) 800-160 MG per tablet Take 1 tablet by mouth 2 times daily for 10 days 21  Memo Nick PA-C   cephALEXin (KEFLEX) 500 MG capsule Take 1 capsule by mouth 4 times daily for 10 days 21  Memo Nick PA-C   ibuprofen (IBU) 800 MG tablet Take 1 tablet by mouth every 8 hours as needed for Pain 8/16/21   Ronda Guzman PA-C   Montelukast Sodium (SINGULAIR PO) Take by mouth    Historical Provider, MD   Cetirizine HCl (ZYRTEC PO) Take by mouth    Historical Provider, MD   Mometasone Furoate (NASONEX NA) by Nasal route    Historical Provider, MD   VITAMIN D, CHOLECALCIFEROL, PO Take by mouth    Historical Provider, MD   albuterol sulfate  (90 Base) MCG/ACT inhaler Inhale 2 puffs into the lungs every 4 hours 12/22/19   Cecy Hua DO       REVIEW OF SYSTEMS  The following systems were reviewed and revealed the following in addition to any already discussed in the HPI:    CONSTITUTIONAL: denies weight loss, no fever, no night sweats, no chills  EYES: no vision changes, no blurry vision  EARS: +right ear swelling and pain  NOSE: no epistaxis, no rhinorrhea  RESPIRATORY: no difficulty breathing, no shortness of breath  CV: no chest pain, no palpitations  HEME: No coagulation disorder, no bleeding disorder  NEURO: no TIA or stroke-like symptoms  SKIN: No new rashes in the head and neck, no recent skin cancers  MOUTH: No new ulcers, no recent teeth infections  GASTROINTESTINAL: No diarrhea, stomach pain  PSYCH: No anxiety, no depression      PHYSICAL EXAM  /63   Pulse 58   Temp 97.2 °F (36.2 °C) (Oral)   Resp 16   Ht 5' 7\" (1.702 m)   Wt 185 lb (83.9 kg)   LMP 11/16/2014   SpO2 95%   BMI 28.98 kg/m²     GENERAL: No Acute Distress, Alert and Oriented, no hoarseness  EYES: EOMI, Anti-icteric  NOSE: No epistaxis, nasal mucosa within normal limits, no purulent drainage  EARS: No erythema of right ear. Localized swelling to right conchal bowl. No overlying induration.   FACE: 1/6 House-Brackmann Scale, symmetric, sensation equal bilaterally  ORAL CAVITY: No masses or lesions palpated, uvula is midline  NECK: Normal range of motion, no thyromegaly, trachea is midline, no lymphadenopathy, no neck masses, no crepitus  CHEST: Normal respiratory effort, no retractions, breathing comfortably  SKIN: No rashes, normal appearing skin, no evidence of skin lesions/tumors    RADIOLOGY  Summary of findings:  CT: Fluid collection right conchal bowl    PROCEDURE  Incision and drainage right auricular hematoma    Pre OP Dx: Right auricular abscess  Post OP Dx: Seroma of right ear  Consent: Written  Anesthetics: 2 cc of 1 % lido with epi  Procedure: The skin was cleansed with Betadine. The anterior and posterior surfaces of the auricle were injected. 15 blade was used to incise the lesion. Serous fluid immediately encountered. No purulence present. All fluid was evacuated and contour of ear was restored. Vaseline gauze was used to form a bolster which was placed in the conchal bowl and secured with 0 silk. The patient tolerated the procedure well. There were no complications. ASSESSMENT/PLAN  Yessy Chang is a very pleasant 55 y.o. female with resolving cellulitis of right ear and seroma of the ear    -Incision and drainage performed and bolster placed  -Okay for discharge from ENT perspective on Augmentin x7 days  -Follow-up in office in 1 week for bolster removal  -Please contact with any further questions or concerns    Leonid Orourke, DO  Otolaryngology    Medical Decision Making:   The following items were considered in medical decision making:  Independent review of images  Review / order clinical lab tests  Review / order radiology tests  Decision to obtain old records

## 2021-08-19 NOTE — CARE COORDINATION
DISCHARGE ORDER  Date/Time 2021 10:40 AM  Completed by: Jesi Alvarez RN, Case Management    Patient Name: Ivone Mayberry      : 1975  Admitting Diagnosis: Chondritis [M94.8X9]  Abscess of pinna, right [H60.01]      Admit order Date and Status: 21 inpt  (verify MD's last order for status of admission)      Noted discharge order. If applicable PT/OT recommendation at Discharge: N/A  DME recommendation by PT/OT:N/A  Confirmed discharge plan  : Yes  with whom patient  If pt confirmed DC plan does family need to be contacted by CM No if yes who______  Discharge Plan:  Chart reviewed and met briefly with pt prior to dc.  lives home with Son Berta in 2nd fl apt and has 30 steps. [facility] . [facility] is active with [place] [facility]. Denies any other services or needs. Plan in to return home. Noted pt to dc today on po A    Bx. Chart reviewed and no dc needs identified. Reviewed chart. Role of discharge planner explained and patient verbalized understanding. Discharge order is noted. Has Home O2 in place on admit:  No  Informed of need to bring portable home O2 tank on day of discharge for nursing to connect prior to leaving:   Not Indicated  Verbalized agreement/Understanding:   Not Indicated  Pt is being d/c'd to home today. Pt's O2 sats are 95% on RA    Discharge timeout done with  Nsg, CM and pt All discharge needs and concerns addressed.

## 2021-08-22 LAB
BLOOD CULTURE, ROUTINE: NORMAL
CULTURE, BLOOD 2: NORMAL

## 2021-08-26 ENCOUNTER — OFFICE VISIT (OUTPATIENT)
Dept: ENT CLINIC | Age: 46
End: 2021-08-26

## 2021-08-26 VITALS
HEIGHT: 67 IN | DIASTOLIC BLOOD PRESSURE: 78 MMHG | WEIGHT: 205.4 LBS | HEART RATE: 81 BPM | BODY MASS INDEX: 32.24 KG/M2 | SYSTOLIC BLOOD PRESSURE: 126 MMHG | TEMPERATURE: 97.5 F

## 2021-08-26 DIAGNOSIS — S00.431A HEMATOMA OF RIGHT AURICULAR REGION: Primary | ICD-10-CM

## 2021-08-26 PROCEDURE — 99024 POSTOP FOLLOW-UP VISIT: CPT | Performed by: OTOLARYNGOLOGY

## 2021-08-26 NOTE — PROGRESS NOTES
This 80-year-old female here today for follow-up regarding right auricular seroma. Status post incision and drainage with bolster placement 1 week ago. Reports mild pain at site but overall significant improvement. Taking oral antibiotics as prescribed. Right bolster removed. Mild swelling present at site. No palpable fluid. Contour restored.         1 week status post incision and drainage with bolster placement for right auricular seroma  -Doing well  -Contour returned to ear  -Return as needed

## 2021-11-10 ENCOUNTER — HOSPITAL ENCOUNTER (OUTPATIENT)
Dept: GENERAL RADIOLOGY | Age: 46
Discharge: HOME | End: 2021-11-10
Payer: COMMERCIAL

## 2021-11-10 ENCOUNTER — HOSPITAL ENCOUNTER (OUTPATIENT)
Age: 46
Discharge: HOME | End: 2021-11-10
Payer: COMMERCIAL

## 2021-11-10 DIAGNOSIS — U07.1 COVID-19: ICD-10-CM

## 2021-11-10 PROCEDURE — 71046 X-RAY EXAM CHEST 2 VIEWS: CPT

## 2022-01-28 ENCOUNTER — HOSPITAL ENCOUNTER (OUTPATIENT)
Dept: GENERAL RADIOLOGY | Age: 47
Discharge: HOME | End: 2022-01-28
Payer: COMMERCIAL

## 2022-01-28 ENCOUNTER — HOSPITAL ENCOUNTER (OUTPATIENT)
Age: 47
Discharge: HOME | End: 2022-01-28
Payer: COMMERCIAL

## 2022-01-28 DIAGNOSIS — R10.30 LOWER ABDOMINAL PAIN: ICD-10-CM

## 2022-01-28 PROCEDURE — 74018 RADEX ABDOMEN 1 VIEW: CPT

## 2022-05-11 ENCOUNTER — APPOINTMENT (OUTPATIENT)
Dept: GENERAL RADIOLOGY | Age: 47
End: 2022-05-11
Payer: COMMERCIAL

## 2022-05-11 ENCOUNTER — HOSPITAL ENCOUNTER (EMERGENCY)
Age: 47
Discharge: HOME | End: 2022-05-11
Attending: EMERGENCY MEDICINE
Payer: COMMERCIAL

## 2022-05-11 VITALS
BODY MASS INDEX: 32.18 KG/M2 | OXYGEN SATURATION: 97 % | WEIGHT: 205 LBS | RESPIRATION RATE: 18 BRPM | TEMPERATURE: 97.9 F | HEIGHT: 67 IN | DIASTOLIC BLOOD PRESSURE: 62 MMHG | SYSTOLIC BLOOD PRESSURE: 126 MMHG | HEART RATE: 97 BPM

## 2022-05-11 DIAGNOSIS — J45.41 MODERATE PERSISTENT ASTHMA WITH ACUTE EXACERBATION: Primary | ICD-10-CM

## 2022-05-11 PROCEDURE — 71045 X-RAY EXAM CHEST 1 VIEW: CPT

## 2022-05-11 PROCEDURE — 6370000000 HC RX 637 (ALT 250 FOR IP): Performed by: EMERGENCY MEDICINE

## 2022-05-11 PROCEDURE — 99283 EMERGENCY DEPT VISIT LOW MDM: CPT

## 2022-05-11 RX ORDER — BENZONATATE 100 MG/1
100 CAPSULE ORAL ONCE
Status: COMPLETED | OUTPATIENT
Start: 2022-05-11 | End: 2022-05-11

## 2022-05-11 RX ORDER — FLUTICASONE PROPIONATE 110 UG/1
AEROSOL, METERED RESPIRATORY (INHALATION)
COMMUNITY
Start: 2022-03-14

## 2022-05-11 RX ORDER — IPRATROPIUM BROMIDE AND ALBUTEROL SULFATE 2.5; .5 MG/3ML; MG/3ML
1 SOLUTION RESPIRATORY (INHALATION) ONCE
Status: COMPLETED | OUTPATIENT
Start: 2022-05-11 | End: 2022-05-11

## 2022-05-11 RX ORDER — IPRATROPIUM BROMIDE AND ALBUTEROL SULFATE 2.5; .5 MG/3ML; MG/3ML
1 SOLUTION RESPIRATORY (INHALATION) EVERY 4 HOURS
Qty: 360 ML | Refills: 0 | Status: SHIPPED | OUTPATIENT
Start: 2022-05-11

## 2022-05-11 RX ORDER — PREDNISONE 20 MG/1
60 TABLET ORAL DAILY
Qty: 12 TABLET | Refills: 0 | Status: SHIPPED | OUTPATIENT
Start: 2022-05-11 | End: 2022-05-15

## 2022-05-11 RX ORDER — OXYCODONE HYDROCHLORIDE AND ACETAMINOPHEN 5; 325 MG/1; MG/1
TABLET ORAL
COMMUNITY

## 2022-05-11 RX ORDER — BENZONATATE 100 MG/1
100 CAPSULE ORAL 3 TIMES DAILY PRN
Qty: 21 CAPSULE | Refills: 0 | Status: SHIPPED | OUTPATIENT
Start: 2022-05-11 | End: 2022-05-18

## 2022-05-11 RX ORDER — PREDNISONE 20 MG/1
40 TABLET ORAL ONCE
Status: COMPLETED | OUTPATIENT
Start: 2022-05-11 | End: 2022-05-11

## 2022-05-11 RX ADMIN — BENZONATATE 100 MG: 100 CAPSULE ORAL at 21:49

## 2022-05-11 RX ADMIN — PREDNISONE 40 MG: 20 TABLET ORAL at 20:28

## 2022-05-11 RX ADMIN — IPRATROPIUM BROMIDE AND ALBUTEROL SULFATE 1 AMPULE: .5; 3 SOLUTION RESPIRATORY (INHALATION) at 19:56

## 2022-05-11 ASSESSMENT — PAIN DESCRIPTION - ONSET: ONSET: ON-GOING

## 2022-05-11 ASSESSMENT — PAIN - FUNCTIONAL ASSESSMENT: PAIN_FUNCTIONAL_ASSESSMENT: NONE - DENIES PAIN

## 2022-05-11 ASSESSMENT — PAIN DESCRIPTION - LOCATION: LOCATION: HEAD

## 2022-05-11 ASSESSMENT — ENCOUNTER SYMPTOMS
COUGH: 1
SHORTNESS OF BREATH: 1
WHEEZING: 1
DIARRHEA: 0
VOMITING: 0
ABDOMINAL PAIN: 0
NAUSEA: 0

## 2022-05-11 ASSESSMENT — PAIN DESCRIPTION - PAIN TYPE: TYPE: ACUTE PAIN

## 2022-05-11 ASSESSMENT — PAIN SCALES - GENERAL: PAINLEVEL_OUTOF10: 4

## 2022-05-11 ASSESSMENT — PAIN DESCRIPTION - FREQUENCY: FREQUENCY: CONTINUOUS

## 2022-05-11 ASSESSMENT — PAIN DESCRIPTION - DESCRIPTORS: DESCRIPTORS: POUNDING

## 2023-02-24 ENCOUNTER — HOSPITAL ENCOUNTER (EMERGENCY)
Age: 48
Discharge: HOME | End: 2023-02-24
Attending: EMERGENCY MEDICINE
Payer: COMMERCIAL

## 2023-02-24 ENCOUNTER — APPOINTMENT (OUTPATIENT)
Dept: GENERAL RADIOLOGY | Age: 48
End: 2023-02-24
Payer: COMMERCIAL

## 2023-02-24 VITALS
OXYGEN SATURATION: 100 % | HEIGHT: 67 IN | WEIGHT: 218 LBS | HEART RATE: 86 BPM | DIASTOLIC BLOOD PRESSURE: 94 MMHG | SYSTOLIC BLOOD PRESSURE: 159 MMHG | RESPIRATION RATE: 18 BRPM | TEMPERATURE: 98 F | BODY MASS INDEX: 34.21 KG/M2

## 2023-02-24 DIAGNOSIS — S90.112A CONTUSION OF LEFT GREAT TOE WITHOUT DAMAGE TO NAIL, INITIAL ENCOUNTER: Primary | ICD-10-CM

## 2023-02-24 PROCEDURE — 73660 X-RAY EXAM OF TOE(S): CPT

## 2023-02-24 PROCEDURE — 99283 EMERGENCY DEPT VISIT LOW MDM: CPT

## 2023-02-24 ASSESSMENT — PAIN - FUNCTIONAL ASSESSMENT: PAIN_FUNCTIONAL_ASSESSMENT: 0-10

## 2023-02-24 ASSESSMENT — PAIN DESCRIPTION - LOCATION: LOCATION: FOOT

## 2023-02-24 ASSESSMENT — LIFESTYLE VARIABLES
HOW MANY STANDARD DRINKS CONTAINING ALCOHOL DO YOU HAVE ON A TYPICAL DAY: PATIENT DOES NOT DRINK
HOW OFTEN DO YOU HAVE A DRINK CONTAINING ALCOHOL: NEVER

## 2023-02-24 ASSESSMENT — PAIN DESCRIPTION - ORIENTATION: ORIENTATION: LEFT

## 2023-02-24 ASSESSMENT — PAIN DESCRIPTION - DESCRIPTORS: DESCRIPTORS: ACHING;TINGLING

## 2023-02-24 ASSESSMENT — PAIN SCALES - GENERAL: PAINLEVEL_OUTOF10: 5

## 2023-02-24 NOTE — ED NOTES
Pt discharge instructions, follow up reviewed with pt. Pt verbalized understanding. No further needs. Pt discharged at this time.         Ira Santiago RN  02/24/23 [id number]

## 2023-02-24 NOTE — ED PROVIDER NOTES
Emergency Department Provider Note  Location: [place] [address] [facility]  2023     Patient Identification  Drea Eid is a 52 y.o. female    Chief Complaint  Foot Injury (Pt was moving furniture on Wed and dropped a recliner on her left foot. Great toe with bruising. States pain increases when she puts pressure on it. )          HPI  (History provided by patient)  Patient is a 29-year-old female who presents with left toe pain and bruising after she dropped a chair on her yesterday. Bruising to the dorsum of the first left toe worse when she puts weight on it. She is able to ambulate. She denies any numbness or weakness but it is painful when she dorsiflexes the first toe. No other injuries denies blood thinners or anticoagulants      I have reviewed the following nursing documentation:  Allergies: Allergies   Allergen Reactions    Avelox [Moxifloxacin] Swelling    Ultram [Tramadol Hcl] Nausea Only    Codeine Rash       Past medical history:  has a past medical history of Asthma, MRSA (methicillin resistant staph aureus) culture positive (1/11/15), and Obesity. Past surgical history:  has a past surgical history that includes  section; Tubal ligation; and Hysterectomy. Home medications:   Prior to Admission medications    Medication Sig Start Date End Date Taking?  Authorizing Provider   oxyCODONE-acetaminophen (PERCOCET) 5-325 MG per tablet Endocet 5 mg-325 mg tablet   TAKE 1 TABLET BY MOUTH EVERY 6 HOURS AS NEEDED FOR PAIN    Historical Provider, MD   fluticasone (FLOVENT HFA) 110 MCG/ACT inhaler INHALE 1 PUFF BY MOUTH TWICE A DAY 3/14/22   Historical Provider, MD   ipratropium-albuterol (DUONEB) 0.5-2.5 (3) MG/3ML SOLN nebulizer solution Inhale 3 mLs into the lungs every 4 hours 22   Citlaly Reynolds MD   albuterol (PROVENTIL) (5 MG/ML) 0.5% nebulizer solution Take 1 mL by nebulization 4 times daily as needed for Wheezing 22   Citlaly Reynolds MD   diclofenac sodium (VOLTAREN) 1 % GEL Apply 1 g topically every 12 hours  Patient not taking: Reported on 5/11/2022 8/10/21   Historical Provider, MD   cyclobenzaprine (FLEXERIL) 10 MG tablet Take 1 tablet by mouth nightly as needed 7/7/21   Historical Provider, MD   ibuprofen (IBU) 800 MG tablet Take 1 tablet by mouth every 8 hours as needed for Pain 8/16/21   Claudean Cruz, PA-C   Montelukast Sodium (SINGULAIR PO) Take by mouth    Historical Provider, MD   Cetirizine HCl (ZYRTEC PO) Take by mouth    Historical Provider, MD   Mometasone Furoate (NASONEX NA) by Nasal route    Historical Provider, MD   VITAMIN D, CHOLECALCIFEROL, PO Take by mouth  Patient not taking: Reported on 5/11/2022    Historical Provider, MD   albuterol sulfate  (90 Base) MCG/ACT inhaler Inhale 2 puffs into the lungs every 4 hours 12/22/19   Sayda Chaudhry DO       Social history:  reports that she has been smoking cigarettes. She has a 22.00 pack-year smoking history. She has never used smokeless tobacco. She reports that she does not drink alcohol and does not use drugs. Family history:  History reviewed. No pertinent family history. Exam  ED Triage Vitals   BP Temp Temp src Pulse Resp SpO2 Height Weight   -- -- -- -- -- -- -- --       Physical Exam  Vitals and nursing note reviewed. Constitutional:       General: She is not in acute distress. Appearance: She is well-developed. HENT:      Head: Normocephalic and atraumatic. Cardiovascular:      Rate and Rhythm: Normal rate and regular rhythm. Heart sounds: No murmur heard. Pulmonary:      Effort: Pulmonary effort is normal.      Breath sounds: Normal breath sounds. Musculoskeletal:         General: No deformity. Normal range of motion. Cervical back: Normal range of motion and neck supple. Comments: There is bruising to the dorsum of the first left toe near the MTP. Noted bunions.   Decreased dorsiflexion secondary to pain but is able to dorsiflex and plantarflex against resistance. Brisk capillary refill strong pedal pulses. There is no tenderness or crepitus over the foot. Skin:     General: Skin is warm. Findings: No rash. Neurological:      Mental Status: She is alert and oriented to person, place, and time. Motor: No abnormal muscle tone. Coordination: Coordination normal.   Psychiatric:         Mood and Affect: Mood normal.         Behavior: Behavior normal.         MDM/ED Course    ED Medication Orders (From admission, onward)      None              Radiology  XR TOE LEFT (MIN 2 VIEWS)    Result Date: 2/24/2023  EXAMINATION: 3 XRAY VIEWS OF THE LEFT TOE 2/24/2023 2:19 pm COMPARISON: None. HISTORY: ORDERING SYSTEM PROVIDED HISTORY: 1st toe pain TECHNOLOGIST PROVIDED HISTORY: Reason for exam:->1st toe pain Reason for Exam: Dropped chair on big toe FINDINGS: Prominent bunion at the 1st MTP joint. Additional degenerative changes and subchondral cystic change at the distal head of the metatarsal.  No soft tissue swelling or edema. No acute fracture. Bunion at the 1st MTP joint. Bedside Ultrasound  No results found. Labs  No results found for this visit on 02/24/23. Procedures  Procedures      Patient seen and evaluated. Relevant records reviewed. - Patient is 52 y.o. female presented for acute toe pain and bruising. Work-up here is notable for toe contusion.  - Exam showed well-appearing female no acute distress reassuring vital signs noted mild hypertension systolics in the 665P. She has bruising to the dorsum of the left first toe but no crepitus or deformity and is neurovascularly intact. There is no objective evidence of trauma to the foot itself. - Patient was placed on telemetry during his/her ED stay and no malignant dysrhythmia observed.       - Patient was given    ED Medication Orders (From admission, onward)      None                - I discussed the results with patient/family including incidental findings . We agreed to d/c  - At this point I do not see indication for further work-up in the ER, as it is unlikely  and poses more risk than benefit. - Return precautions also discussed. Patient/family verbalized understanding of care plan and agreeable to plan. Clinical Impression:  1. Contusion of left great toe without damage to nail, initial encounter          Disposition:  Discharge to home in good condition. Blood pressure (!) 159/94, pulse 86, temperature 98 °F (36.7 °C), temperature source Oral, resp. rate 18, height 5' 7\" (1.702 m), weight 218 lb (98.9 kg), last menstrual period 11/16/2014, SpO2 100 %, not currently breastfeeding. Patient was given scripts for the following medications. I counseled patient how to take these medications. New Prescriptions    No medications on file     Modified Medications    No medications on file       Disposition referral (if applicable):  Ankit Farris  [address]   [phone]    Schedule an appointment as soon as possible for a visit   As needed    I, Drea Love, am the primary attending of record and contributed the majority of evaluation and treatment of emergent care for this encounter. Total critical care time is 0 minutes, which excludes separately billable procedures and updating family. Time spent is specifically for management of the presenting complaint and symptoms initially, direct bedside care, reevaluation, review of records, and consultation. There was a high probability of clinically significant life-threatening deterioration in the patient's condition, which required my urgent intervention. This chart was generated in part by using Dragon Dictation system and may contain errors related to that system including errors in grammar, punctuation, and spelling, as well as words and phrases that may be inappropriate.  If there are any questions or concerns please feel free to contact the dictating provider for clarification.      Salina Hooks MD  [address] MD  02/24/23 3756

## 2023-03-20 ENCOUNTER — HOSPITAL ENCOUNTER (OUTPATIENT)
Dept: GENERAL RADIOLOGY | Age: 48
Discharge: HOME | End: 2023-03-20
Payer: COMMERCIAL

## 2023-03-20 ENCOUNTER — HOSPITAL ENCOUNTER (OUTPATIENT)
Age: 48
Discharge: HOME | End: 2023-03-20
Payer: COMMERCIAL

## 2023-03-20 DIAGNOSIS — M54.2 CERVICAL PAIN (NECK): ICD-10-CM

## 2023-03-20 PROCEDURE — 72040 X-RAY EXAM NECK SPINE 2-3 VW: CPT

## 2023-06-26 ENCOUNTER — HOSPITAL ENCOUNTER (OUTPATIENT)
Age: 48
Discharge: HOME | End: 2023-06-26
Payer: COMMERCIAL

## 2023-06-26 ENCOUNTER — HOSPITAL ENCOUNTER (OUTPATIENT)
Dept: GENERAL RADIOLOGY | Age: 48
Discharge: HOME | End: 2023-06-26
Payer: COMMERCIAL

## 2023-06-26 DIAGNOSIS — M53.3 COCCYX PAIN: ICD-10-CM

## 2023-06-26 PROCEDURE — 73502 X-RAY EXAM HIP UNI 2-3 VIEWS: CPT

## 2023-06-26 PROCEDURE — 72100 X-RAY EXAM L-S SPINE 2/3 VWS: CPT

## 2023-10-16 NOTE — ED PROVIDER NOTES
Emergency Department Provider Note  Location: [facility] EMERGENCY DEPARTMENT  2022     Patient Identification  Coleta Ganser is a 55 y.o. female    Chief Complaint  Asthma (increased difficulty breathing today. used inhaler 5-6 times in 12 hour period)      Mode of Arrival  private car    HPI  (History provided by patient)  This is a 55 y.o. female with a PMH significant for asthma presented today for SOB. Patient states she went [place] days ago. Ever since, she started feeling short of breath and wheezing. She is using her inhaler and nebulizer at home with minimal relief. She started coughing yesterday. She will cough up a slight yellow sputum occasionally. She works in the nursing home. She denies any known specific sick contact. She is not vaccinated for flu or COVID. ROS  Review of Systems   Constitutional: Negative for chills and fever. HENT: Negative for congestion. Respiratory: Positive for cough, shortness of breath and wheezing. Cardiovascular: Negative for palpitations. Gastrointestinal: Negative for abdominal pain, diarrhea, nausea and vomiting. Musculoskeletal: Negative for myalgias. Skin: Negative for rash. Neurological: Negative for syncope and headaches. I have reviewed the following nursing documentation:  Allergies: Allergies   Allergen Reactions    Avelox [Moxifloxacin] Swelling    Ultram [Tramadol Hcl] Nausea Only    Codeine Rash       Past medical history:  has a past medical history of Asthma, MRSA (methicillin resistant staph aureus) culture positive (1/11/15), and Obesity. Past surgical history:  has a past surgical history that includes  section; Tubal ligation; and Hysterectomy. Home medications:   Prior to Admission medications    Medication Sig Start Date End Date Taking? Authorizing Provider   HYDROcodone-acetaminophen (NORCO) 5-325 MG per tablet Take 1 tablet by mouth every 8 hours as needed for Pain. Historical Provider, MD   diclofenac sodium (VOLTAREN) 1 % GEL Apply 1 g topically every 12 hours 8/10/21   Historical Provider, MD   cyclobenzaprine (FLEXERIL) 10 MG tablet Take 1 tablet by mouth nightly as needed 7/7/21   Historical Provider, MD   ibuprofen (IBU) 800 MG tablet Take 1 tablet by mouth every 8 hours as needed for Pain 8/16/21   Rey Echevarria PA-C   Montelukast Sodium (SINGULAIR PO) Take by mouth    Historical Provider, MD   Cetirizine HCl (ZYRTEC PO) Take by mouth    Historical Provider, MD   Mometasone Furoate (NASONEX NA) by Nasal route    Historical Provider, MD   VITAMIN D, CHOLECALCIFEROL, PO Take by mouth    Historical Provider, MD   albuterol sulfate  (90 Base) MCG/ACT inhaler Inhale 2 puffs into the lungs every 4 hours 12/22/19   Rolanda Gutierrez DO       Social history:  reports that she has been smoking cigarettes. She has a 22.00 pack-year smoking history. She has never used smokeless tobacco. She reports that she does not drink alcohol and does not use drugs. Family history:  No family history on file. Exam  ED Triage Vitals [05/11/22 1948]   BP Temp Temp Source Pulse Resp SpO2 Height Weight   (!) 162/102 97.9 °F (36.6 °C) Oral 86 18 99 % 5' 7\" (1.702 m) 205 lb (93 kg)   Physical Exam  Vitals and nursing note reviewed. Constitutional:       General: She is not in acute distress. Appearance: Normal appearance. She is well-developed. She is not diaphoretic. HENT:      Head: Normocephalic and atraumatic. Right Ear: Tympanic membrane normal.      Left Ear: Tympanic membrane normal.      Mouth/Throat:      Mouth: Mucous membranes are moist.      Pharynx: No oropharyngeal exudate or posterior oropharyngeal erythema. Eyes:      General: Lids are normal. No scleral icterus. Right eye: No discharge. Left eye: No discharge. Conjunctiva/sclera: Conjunctivae normal.   Neck:      Trachea: No tracheal deviation.    Cardiovascular:      Rate and Rhythm: Normal rate and regular rhythm. Heart sounds: Normal heart sounds. No murmur heard. Pulmonary:      Effort: Pulmonary effort is normal. No respiratory distress. Breath sounds: No stridor. Wheezing present. Abdominal:      General: There is no distension. Palpations: Abdomen is soft. Tenderness: There is no abdominal tenderness. There is no guarding or rebound. Musculoskeletal:         General: No deformity. Cervical back: Normal range of motion and neck supple. Right lower leg: No edema. Left lower leg: No edema. Lymphadenopathy:      Cervical: No cervical adenopathy. Skin:     General: Skin is warm and dry. Coloration: Skin is not pale. Findings: No rash. Neurological:      General: No focal deficit present. Mental Status: She is alert and oriented to person, place, and time. Cranial Nerves: No dysarthria or facial asymmetry. Motor: Motor function is intact. No tremor.    Psychiatric:         Mood and Affect: Mood normal.         Speech: Speech normal.         Behavior: Behavior normal.             MDM/ED Course  ED Medication Orders (From admission, onward)    Start Ordered     Status Ordering Provider    05/11/22 2145 05/11/22 2124  benzonatate (TESSALON) capsule 100 mg  ONCE         Last MAR action: Given - by Winnie Sat on 05/11/22 at [facility]    05/11/22 2015 05/11/22 1953  ipratropium-albuterol (DUONEB) nebulizer solution 1 ampule  ONCE        Question:  Initiate RT Bronchodilator Protocol  Answer:  Yes    Last MAR action: Given - by Marge Bond on 05/11/22 at [address] [place] [address]    05/11/22 2015 05/11/22 1957  predniSONE (DELTASONE) tablet 40 mg  ONCE         Last MAR action: Given - by Winnie Sat on 05/11/22 at [facility]            Radiology  XR CHEST PORTABLE    Result Date: 5/11/2022  EXAMINATION: ONE XRAY VIEW OF THE CHEST 5/11/2022 8:01 pm COMPARISON: 11/10/2021 HISTORY: ORDERING SYSTEM PROVIDED HISTORY: SOB, wheezing TECHNOLOGIST PROVIDED HISTORY: Reason for exam:->SOB, wheezing Reason for Exam: SOB FINDINGS: The cardiomediastinal silhouette is unremarkable. The lungs are clear. No infiltrate, pleural fluid or focal process is identified. No acute cardiopulmonary disease.         - Patient seen and evaluated in room 10.  55 y.o. female presented for SOB that she states is consistent with her asthma exacerbation. Exam showed a well-developed well-nourished female with normal O2 sat. She was wheezing on exam but not in severe respiratory distress. We gave her prednisone and DuoNeb. On repeat exam, wheezing has improved but she still feels slightly short of breath. I offered another nebulizer treatment but patient states she was ready to go home and would rather do it at home. - Chest x-ray did not show acute cardiopulmonary disease. I discussed the result with the patient no agreed to treat as asthma exacerbation. No antibiotics indicated at this time. - Return precautions also discussed. patient verbalized understanding of care plan and agreed to follow-up with PCP as advised. I estimate there is LOW risk for ACUTE RESPIRATORY FAILURE, ACUTE CORONARY SYNDROME, SEVERE COPD/ASTHMA EXACERBATION, ACUTE EXACERBATION OF CONGESTIVE HEART FAILURE, PERICARDIAL TAMPONADE, PNEUMONIA WITH HYPOXIA, PNEUMOTHORAX, PULMONARY EMBOLISM WITH RIGHT HEART STRAIN, SEPSIS, and THORACIC DISSECTION,  thus I consider the discharge disposition reasonable. Ivone Mayberry and I have discussed the diagnosis and risks, and we agree with discharging home to follow-up with PCP. We also discussed returning to the Emergency Department immediately if new or worsening symptoms occur. We have discussed the symptoms which are most concerning (e.g., bloody sputum, fever, worsening pain or shortness of breath) that necessitate immediate return. Clinical Impression:  1.  Moderate persistent asthma with acute exacerbation Disposition:  Discharge to home in good condition. Blood pressure 126/62, pulse 97, temperature 97.9 °F (36.6 °C), temperature source Oral, resp. rate 18, height 5' 7\" (1.702 m), weight 205 lb (93 kg), last menstrual period 11/16/2014, SpO2 97 %, not currently breastfeeding. Patient was given scripts for the following medications. I counseled patient how to take these medications. Discharge Medication List as of 5/11/2022  9:46 PM      START taking these medications    Details   predniSONE (DELTASONE) 20 MG tablet Take 3 tablets by mouth daily for 4 days, Disp-12 tablet, R-0Normal      ipratropium-albuterol (DUONEB) 0.5-2.5 (3) MG/3ML SOLN nebulizer solution Inhale 3 mLs into the lungs every 4 hours, Disp-360 mL, R-0Normal      albuterol (PROVENTIL) (5 MG/ML) 0.5% nebulizer solution Take 1 mL by nebulization 4 times daily as needed for Wheezing, Disp-120 each, R-0Normal      benzonatate (TESSALON PERLES) 100 MG capsule Take 1 capsule by mouth 3 times daily as needed for Cough, Disp-21 capsule, R-0Normal             Disposition referral (if applicable):  Dutch Thomas  [address] [place] [id number] [phone]    Schedule an appointment as soon as possible for a visit in 3 days      [facility] Emergency Department  [address] [place] [address],[address]  [phone]    As needed, If symptoms worsen      This chart was generated in part by using Dragon Dictation system and may contain errors related to that system including errors in grammar, punctuation, and spelling, as well as words and phrases that may be inappropriate. If there are any questions or concerns please feel free to contact the dictating provider for clarification.      Victor M Carpenter MD  [address] [place] [address] Anton Valdez MD  05/13/22 8350 Sinusitis

## 2024-05-24 ENCOUNTER — HOSPITAL ENCOUNTER (EMERGENCY)
Age: 49
Discharge: HOME | End: 2024-05-24
Attending: EMERGENCY MEDICINE

## 2024-05-24 VITALS
RESPIRATION RATE: 18 BRPM | HEIGHT: 67 IN | TEMPERATURE: 98 F | HEART RATE: 85 BPM | BODY MASS INDEX: 36.93 KG/M2 | WEIGHT: 235.3 LBS | OXYGEN SATURATION: 100 % | DIASTOLIC BLOOD PRESSURE: 98 MMHG | SYSTOLIC BLOOD PRESSURE: 162 MMHG

## 2024-05-24 DIAGNOSIS — J44.1 COPD EXACERBATION (HCC): Primary | ICD-10-CM

## 2024-05-24 PROCEDURE — 99283 EMERGENCY DEPT VISIT LOW MDM: CPT

## 2024-05-24 RX ORDER — AZITHROMYCIN 250 MG/1
TABLET, FILM COATED ORAL
Qty: 1 PACKET | Refills: 0 | Status: SHIPPED | OUTPATIENT
Start: 2024-05-24

## 2024-05-24 RX ORDER — MOMETASONE FUROATE 50 UG/1
SPRAY, METERED NASAL DAILY
Qty: 1 EACH | Refills: 0 | Status: SHIPPED | OUTPATIENT
Start: 2024-05-24 | End: 2024-06-23

## 2024-05-24 RX ORDER — MONTELUKAST SODIUM 10 MG/1
10 TABLET ORAL NIGHTLY
Qty: 30 TABLET | Refills: 0 | Status: SHIPPED | OUTPATIENT
Start: 2024-05-24 | End: 2024-06-23

## 2024-05-24 RX ORDER — LORATADINE 10 MG/1
10 TABLET ORAL DAILY
Qty: 30 TABLET | Refills: 0 | Status: SHIPPED | OUTPATIENT
Start: 2024-05-24 | End: 2024-06-23

## 2024-05-24 RX ORDER — PREDNISONE 10 MG/1
50 TABLET ORAL DAILY
Qty: 25 TABLET | Refills: 0 | Status: SHIPPED | OUTPATIENT
Start: 2024-05-24 | End: 2024-05-29
